# Patient Record
Sex: FEMALE | Race: WHITE | NOT HISPANIC OR LATINO | Employment: OTHER
[De-identification: names, ages, dates, MRNs, and addresses within clinical notes are randomized per-mention and may not be internally consistent; named-entity substitution may affect disease eponyms.]

---

## 2017-06-21 ENCOUNTER — LAB SERVICES (OUTPATIENT)
Dept: OTHER | Age: 72
End: 2017-06-21

## 2017-06-21 ENCOUNTER — CHARTING TRANS (OUTPATIENT)
Dept: OTHER | Age: 72
End: 2017-06-21

## 2017-06-22 LAB
ALBUMIN SERPL-MCNC: 4.2 G/DL (ref 3.6–5.1)
ALBUMIN/GLOB SERPL: 1.2 (ref 1–2.4)
ALP SERPL-CCNC: 77 UNITS/L (ref 45–117)
ALT SERPL-CCNC: 36 UNITS/L
ANION GAP SERPL CALC-SCNC: 11 MMOL/L (ref 10–20)
AST SERPL-CCNC: 23 UNITS/L
BILIRUB SERPL-MCNC: 0.6 MG/DL (ref 0.2–1)
BUN SERPL-MCNC: 18 MG/DL (ref 6–20)
BUN/CREAT SERPL: 27 (ref 7–25)
CALCIUM SERPL-MCNC: 10 MG/DL (ref 8.4–10.2)
CHLORIDE SERPL-SCNC: 104 MMOL/L (ref 98–107)
CHOLEST SERPL-MCNC: 223 MG/DL
CHOLEST/HDLC SERPL: 3.8
CO2 SERPL-SCNC: 30 MMOL/L (ref 21–32)
CREAT SERPL-MCNC: 0.68 MG/DL (ref 0.51–0.95)
GLOBULIN SER-MCNC: 3.4 G/DL (ref 2–4)
GLUCOSE SERPL-MCNC: 95 MG/DL (ref 65–99)
HBA1C MFR BLD: 5.6 % (ref 4.5–5.6)
HDLC SERPL-MCNC: 58 MG/DL
LDLC SERPL CALC-MCNC: 139 MG/DL
LENGTH OF FAST TIME PATIENT: ABNORMAL HRS
LENGTH OF FAST TIME PATIENT: ABNORMAL HRS
NONHDLC SERPL-MCNC: 165 MG/DL
POTASSIUM SERPL-SCNC: 4.3 MMOL/L (ref 3.4–5.1)
SODIUM SERPL-SCNC: 141 MMOL/L (ref 135–145)
TOTAL PROTEIN: 7.6 G/DL (ref 6.4–8.2)
TRIGL SERPL-MCNC: 131 MG/DL

## 2017-12-18 ENCOUNTER — LAB SERVICES (OUTPATIENT)
Dept: OTHER | Age: 72
End: 2017-12-18

## 2017-12-18 ENCOUNTER — CHARTING TRANS (OUTPATIENT)
Dept: OTHER | Age: 72
End: 2017-12-18

## 2017-12-19 LAB
ALBUMIN SERPL-MCNC: 4 G/DL (ref 3.6–5.1)
ALBUMIN/GLOB SERPL: 1.2 (ref 1–2.4)
ALP SERPL-CCNC: 70 UNITS/L (ref 45–117)
ALT SERPL-CCNC: 27 UNITS/L
ANION GAP SERPL CALC-SCNC: 14 MMOL/L (ref 10–20)
AST SERPL-CCNC: 17 UNITS/L
BILIRUB SERPL-MCNC: 0.6 MG/DL (ref 0.2–1)
BUN SERPL-MCNC: 12 MG/DL (ref 6–20)
BUN/CREAT SERPL: 19 (ref 7–25)
CALCIUM SERPL-MCNC: 9.9 MG/DL (ref 8.4–10.2)
CHLORIDE SERPL-SCNC: 103 MMOL/L (ref 98–107)
CHOLEST SERPL-MCNC: 215 MG/DL
CHOLEST/HDLC SERPL: 3.3
CO2 SERPL-SCNC: 29 MMOL/L (ref 21–32)
CREAT SERPL-MCNC: 0.64 MG/DL (ref 0.51–0.95)
GLOBULIN SER-MCNC: 3.2 G/DL (ref 2–4)
GLUCOSE SERPL-MCNC: 96 MG/DL (ref 65–99)
HDLC SERPL-MCNC: 65 MG/DL
LDLC SERPL CALC-MCNC: 123 MG/DL
LENGTH OF FAST TIME PATIENT: ABNORMAL HRS
LENGTH OF FAST TIME PATIENT: NORMAL HRS
NONHDLC SERPL-MCNC: 150 MG/DL
POTASSIUM SERPL-SCNC: 4 MMOL/L (ref 3.4–5.1)
SERVICE CMNT-IMP: NORMAL
SODIUM SERPL-SCNC: 142 MMOL/L (ref 135–145)
TOTAL PROTEIN: 7.2 G/DL (ref 6.4–8.2)
TRIGL SERPL-MCNC: 134 MG/DL

## 2017-12-21 LAB — TSH SERPL-ACNC: 2.67 MCUNITS/ML (ref 0.35–5)

## 2018-04-30 ENCOUNTER — CHARTING TRANS (OUTPATIENT)
Dept: OTHER | Age: 73
End: 2018-04-30

## 2018-06-13 ENCOUNTER — CHARTING TRANS (OUTPATIENT)
Dept: OTHER | Age: 73
End: 2018-06-13

## 2018-11-01 VITALS — RESPIRATION RATE: 16 BRPM | WEIGHT: 145 LBS | OXYGEN SATURATION: 97 % | HEART RATE: 66 BPM

## 2018-11-01 VITALS
TEMPERATURE: 98 F | OXYGEN SATURATION: 98 % | WEIGHT: 140 LBS | HEART RATE: 60 BPM | BODY MASS INDEX: 27.48 KG/M2 | HEIGHT: 60 IN | RESPIRATION RATE: 16 BRPM

## 2018-11-02 VITALS — WEIGHT: 139 LBS | HEART RATE: 60 BPM | RESPIRATION RATE: 16 BRPM

## 2018-11-03 VITALS
OXYGEN SATURATION: 98 % | HEART RATE: 56 BPM | TEMPERATURE: 97 F | RESPIRATION RATE: 16 BRPM | BODY MASS INDEX: 27.68 KG/M2 | HEIGHT: 60 IN | WEIGHT: 141 LBS

## 2018-12-18 ENCOUNTER — TELEPHONE (OUTPATIENT)
Dept: SCHEDULING | Age: 73
End: 2018-12-18

## 2018-12-20 RX ORDER — FLUTICASONE PROPIONATE 50 MCG
SPRAY, SUSPENSION (ML) NASAL
COMMUNITY
Start: 2018-04-30 | End: 2020-08-28 | Stop reason: SDUPTHER

## 2018-12-20 RX ORDER — FLUTICASONE PROPIONATE 50 MCG
SPRAY, SUSPENSION (ML) NASAL
COMMUNITY
End: 2018-12-24 | Stop reason: ALTCHOICE

## 2018-12-20 RX ORDER — LISINOPRIL AND HYDROCHLOROTHIAZIDE 12.5; 1 MG/1; MG/1
TABLET ORAL
COMMUNITY
Start: 2018-06-13 | End: 2018-12-24 | Stop reason: SDUPTHER

## 2018-12-20 RX ORDER — LORATADINE 10 MG/1
TABLET ORAL
COMMUNITY
End: 2019-03-27 | Stop reason: SDUPTHER

## 2018-12-24 ENCOUNTER — OFFICE VISIT (OUTPATIENT)
Dept: FAMILY MEDICINE | Age: 73
End: 2018-12-24

## 2018-12-24 VITALS
BODY MASS INDEX: 26.06 KG/M2 | SYSTOLIC BLOOD PRESSURE: 114 MMHG | WEIGHT: 138 LBS | HEART RATE: 62 BPM | TEMPERATURE: 97.1 F | DIASTOLIC BLOOD PRESSURE: 77 MMHG | HEIGHT: 61 IN | OXYGEN SATURATION: 98 %

## 2018-12-24 DIAGNOSIS — Z23 NEED FOR INFLUENZA VACCINATION: ICD-10-CM

## 2018-12-24 DIAGNOSIS — Z91.09 ENVIRONMENTAL ALLERGIES: ICD-10-CM

## 2018-12-24 DIAGNOSIS — R53.83 OTHER FATIGUE: ICD-10-CM

## 2018-12-24 DIAGNOSIS — Z12.39 SCREENING FOR BREAST CANCER: ICD-10-CM

## 2018-12-24 DIAGNOSIS — H35.30 MACULAR DEGENERATION OF BOTH EYES, UNSPECIFIED TYPE: ICD-10-CM

## 2018-12-24 DIAGNOSIS — Z13.820 SCREENING FOR OSTEOPOROSIS: ICD-10-CM

## 2018-12-24 DIAGNOSIS — I10 ESSENTIAL HYPERTENSION: Primary | ICD-10-CM

## 2018-12-24 DIAGNOSIS — M89.9 DISORDER OF BONE: ICD-10-CM

## 2018-12-24 DIAGNOSIS — E78.5 HYPERLIPIDEMIA, UNSPECIFIED HYPERLIPIDEMIA TYPE: ICD-10-CM

## 2018-12-24 PROBLEM — T78.3XXA ANGIOEDEMA: Status: ACTIVE | Noted: 2018-12-24

## 2018-12-24 PROBLEM — R59.0 LYMPHADENOPATHY OF LEFT CERVICAL REGION: Status: ACTIVE | Noted: 2018-12-24

## 2018-12-24 PROCEDURE — 90662 IIV NO PRSV INCREASED AG IM: CPT

## 2018-12-24 PROCEDURE — G0008 ADMIN INFLUENZA VIRUS VAC: HCPCS

## 2018-12-24 PROCEDURE — 99214 OFFICE O/P EST MOD 30 MIN: CPT | Performed by: FAMILY MEDICINE

## 2018-12-24 RX ORDER — LISINOPRIL AND HYDROCHLOROTHIAZIDE 12.5; 1 MG/1; MG/1
1 TABLET ORAL DAILY
Qty: 90 TABLET | Refills: 3 | Status: SHIPPED | OUTPATIENT
Start: 2018-12-24 | End: 2019-03-27 | Stop reason: SDUPTHER

## 2018-12-24 ASSESSMENT — ENCOUNTER SYMPTOMS
SINUS PAIN: 0
DIARRHEA: 0
NAUSEA: 0
WOUND: 0
EYE ITCHING: 0
FEVER: 0
SINUS PRESSURE: 0
HEADACHES: 0
CONSTIPATION: 0
FATIGUE: 1
ABDOMINAL PAIN: 0
EYE DISCHARGE: 0
EYE REDNESS: 0
WEAKNESS: 0
SHORTNESS OF BREATH: 0
RHINORRHEA: 0
EYE PAIN: 0
APPETITE CHANGE: 0
LIGHT-HEADEDNESS: 0
VOMITING: 0
WHEEZING: 0
ADENOPATHY: 1
DIZZINESS: 0
COUGH: 0
SORE THROAT: 0
TROUBLE SWALLOWING: 0
CHILLS: 0
NUMBNESS: 0

## 2018-12-24 ASSESSMENT — PATIENT HEALTH QUESTIONNAIRE - PHQ9
1. LITTLE INTEREST OR PLEASURE IN DOING THINGS: NOT AT ALL
SUM OF ALL RESPONSES TO PHQ9 QUESTIONS 1 AND 2: 0
2. FEELING DOWN, DEPRESSED OR HOPELESS: NOT AT ALL

## 2018-12-24 ASSESSMENT — COGNITIVE AND FUNCTIONAL STATUS - GENERAL
BECAUSE OF A PHYSICAL, MENTAL, OR EMOTIONAL CONDITION, DO YOU HAVE DIFFICULTY DOING ERRANDS ALONE: NO
DO YOU HAVE SERIOUS DIFFICULTY WALKING OR CLIMBING STAIRS: NO
DO YOU HAVE DIFFICULTY DRESSING OR BATHING: NO
BECAUSE OF A PHYSICAL, MENTAL, OR EMOTIONAL CONDITION, DO YOU HAVE SERIOUS DIFFICULTY CONCENTRATING, REMEMBERING OR MAKING DECISIONS: NO

## 2018-12-25 LAB
ALBUMIN SERPL-MCNC: 3.8 G/DL (ref 3.6–5.1)
ALBUMIN/GLOB SERPL: 1.2 {RATIO} (ref 1–2.4)
ALP SERPL-CCNC: 72 UNITS/L (ref 45–117)
ALT SERPL-CCNC: 25 UNITS/L
ANION GAP SERPL CALC-SCNC: 11 MMOL/L (ref 10–20)
AST SERPL-CCNC: 22 UNITS/L
BASOPHILS # BLD AUTO: 0 K/MCL (ref 0–0.3)
BASOPHILS NFR BLD AUTO: 1 %
BILIRUB SERPL-MCNC: 0.7 MG/DL (ref 0.2–1)
BUN SERPL-MCNC: 13 MG/DL (ref 6–20)
BUN/CREAT SERPL: 20 (ref 7–25)
CALCIUM SERPL-MCNC: 8.7 MG/DL (ref 8.4–10.2)
CHLORIDE SERPL-SCNC: 102 MMOL/L (ref 98–107)
CHOLEST SERPL-MCNC: 215 MG/DL
CHOLEST/HDLC SERPL: 3.7 {RATIO}
CO2 SERPL-SCNC: 29 MMOL/L (ref 21–32)
CREAT SERPL-MCNC: 0.66 MG/DL (ref 0.51–0.95)
DIFFERENTIAL METHOD BLD: NORMAL
EOSINOPHIL # BLD AUTO: 0.1 K/MCL (ref 0.1–0.5)
EOSINOPHIL NFR SPEC: 1 %
ERYTHROCYTE [DISTWIDTH] IN BLOOD: 12.3 % (ref 11–15)
FASTING STATUS PATIENT QL REPORTED: NORMAL HRS
GLOBULIN SER-MCNC: 3.3 G/DL (ref 2–4)
GLUCOSE SERPL-MCNC: 93 MG/DL (ref 65–99)
HCT VFR BLD CALC: 41 % (ref 36–46.5)
HDLC SERPL-MCNC: 58 MG/DL
HGB BLD-MCNC: 13.6 G/DL (ref 12–15.5)
IMM GRANULOCYTES # BLD AUTO: 0 K/MCL (ref 0–0.2)
IMM GRANULOCYTES NFR BLD: 1 %
LDLC SERPL-MCNC: 130 MG/DL
LENGTH OF FAST TIME PATIENT: ABNORMAL HRS
LYMPHOCYTES # BLD MANUAL: 2 K/MCL (ref 1–4)
LYMPHOCYTES NFR BLD MANUAL: 36 %
MCH RBC QN AUTO: 32.6 PG (ref 26–34)
MCHC RBC AUTO-ENTMCNC: 33.2 G/DL (ref 32–36.5)
MCV RBC AUTO: 98.3 FL (ref 78–100)
MONOCYTES # BLD MANUAL: 0.6 K/MCL (ref 0.3–0.9)
MONOCYTES NFR BLD MANUAL: 11 %
NEUTROPHILS # BLD: 2.8 K/MCL (ref 1.8–7.7)
NEUTROPHILS NFR BLD AUTO: 50 %
NONHDLC SERPL-MCNC: 157 MG/DL
NRBC BLD MANUAL-RTO: 0 /100 WBC
PLATELET # BLD: 312 K/MCL (ref 140–450)
POTASSIUM SERPL-SCNC: 3.6 MMOL/L (ref 3.4–5.1)
PROT SERPL-MCNC: 7.1 G/DL (ref 6.4–8.2)
RBC # BLD: 4.17 MIL/MCL (ref 4–5.2)
SODIUM SERPL-SCNC: 138 MMOL/L (ref 135–145)
TRIGL SERPL-MCNC: 135 MG/DL
TSH SERPL-ACNC: 2.3 MCUNITS/ML (ref 0.35–5)
WBC # BLD: 5.5 K/MCL (ref 4.2–11)

## 2019-01-01 ENCOUNTER — EXTERNAL RECORD (OUTPATIENT)
Dept: HEALTH INFORMATION MANAGEMENT | Facility: OTHER | Age: 74
End: 2019-01-01

## 2019-03-27 ENCOUNTER — TELEPHONE (OUTPATIENT)
Dept: SCHEDULING | Age: 74
End: 2019-03-27

## 2019-03-27 DIAGNOSIS — I10 ESSENTIAL HYPERTENSION: ICD-10-CM

## 2019-03-27 RX ORDER — LISINOPRIL AND HYDROCHLOROTHIAZIDE 12.5; 1 MG/1; MG/1
1 TABLET ORAL DAILY
Qty: 90 TABLET | Refills: 2 | Status: SHIPPED | OUTPATIENT
Start: 2019-03-27 | End: 2019-11-04 | Stop reason: SDUPTHER

## 2019-03-27 RX ORDER — LORATADINE 10 MG/1
10 TABLET ORAL DAILY
Qty: 90 TABLET | Refills: 0 | Status: SHIPPED | OUTPATIENT
Start: 2019-03-27 | End: 2019-11-04 | Stop reason: SDUPTHER

## 2019-08-12 ENCOUNTER — TELEPHONE (OUTPATIENT)
Dept: SCHEDULING | Age: 74
End: 2019-08-12

## 2019-08-12 RX ORDER — FLUTICASONE PROPIONATE 50 MCG
2 SPRAY, SUSPENSION (ML) NASAL DAILY
Qty: 16 G | Refills: 1 | Status: SHIPPED | OUTPATIENT
Start: 2019-08-12 | End: 2019-11-04 | Stop reason: SDUPTHER

## 2019-08-26 ENCOUNTER — TELEPHONE (OUTPATIENT)
Dept: SCHEDULING | Age: 74
End: 2019-08-26

## 2019-10-23 ENCOUNTER — TELEPHONE (OUTPATIENT)
Dept: SCHEDULING | Age: 74
End: 2019-10-23

## 2019-10-23 ENCOUNTER — OFFICE VISIT (OUTPATIENT)
Dept: FAMILY MEDICINE | Age: 74
End: 2019-10-23

## 2019-10-23 VITALS
TEMPERATURE: 98.4 F | HEART RATE: 78 BPM | WEIGHT: 139.66 LBS | DIASTOLIC BLOOD PRESSURE: 70 MMHG | SYSTOLIC BLOOD PRESSURE: 110 MMHG | BODY MASS INDEX: 26.37 KG/M2 | HEIGHT: 61 IN

## 2019-10-23 DIAGNOSIS — J06.9 VIRAL URI WITH COUGH: Primary | ICD-10-CM

## 2019-10-23 DIAGNOSIS — Z23 NEEDS FLU SHOT: ICD-10-CM

## 2019-10-23 PROCEDURE — 90662 IIV NO PRSV INCREASED AG IM: CPT

## 2019-10-23 PROCEDURE — 99213 OFFICE O/P EST LOW 20 MIN: CPT | Performed by: FAMILY MEDICINE

## 2019-10-23 PROCEDURE — G0008 ADMIN INFLUENZA VIRUS VAC: HCPCS

## 2019-10-23 SDOH — HEALTH STABILITY: PHYSICAL HEALTH: ON AVERAGE, HOW MANY DAYS PER WEEK DO YOU ENGAGE IN MODERATE TO STRENUOUS EXERCISE (LIKE A BRISK WALK)?: 0 DAYS

## 2019-10-23 SDOH — HEALTH STABILITY: PHYSICAL HEALTH: ON AVERAGE, HOW MANY MINUTES DO YOU ENGAGE IN EXERCISE AT THIS LEVEL?: 0 MIN

## 2019-10-23 ASSESSMENT — PATIENT HEALTH QUESTIONNAIRE - PHQ9
1. LITTLE INTEREST OR PLEASURE IN DOING THINGS: NOT AT ALL
SUM OF ALL RESPONSES TO PHQ9 QUESTIONS 1 AND 2: 0
SUM OF ALL RESPONSES TO PHQ9 QUESTIONS 1 AND 2: 0
2. FEELING DOWN, DEPRESSED OR HOPELESS: NOT AT ALL

## 2019-10-23 ASSESSMENT — ENCOUNTER SYMPTOMS
GASTROINTESTINAL NEGATIVE: 1
FEVER: 0

## 2019-11-04 ENCOUNTER — OFFICE VISIT (OUTPATIENT)
Dept: FAMILY MEDICINE | Age: 74
End: 2019-11-04

## 2019-11-04 VITALS
HEIGHT: 61 IN | HEART RATE: 67 BPM | WEIGHT: 137 LBS | DIASTOLIC BLOOD PRESSURE: 64 MMHG | BODY MASS INDEX: 25.86 KG/M2 | SYSTOLIC BLOOD PRESSURE: 102 MMHG | RESPIRATION RATE: 18 BRPM

## 2019-11-04 DIAGNOSIS — I10 ESSENTIAL HYPERTENSION: Primary | ICD-10-CM

## 2019-11-04 DIAGNOSIS — E78.5 HYPERLIPIDEMIA, UNSPECIFIED HYPERLIPIDEMIA TYPE: ICD-10-CM

## 2019-11-04 DIAGNOSIS — L98.9 FACE LESION: ICD-10-CM

## 2019-11-04 DIAGNOSIS — H35.3212 EXUDATIVE AGE-RELATED MACULAR DEGENERATION, RIGHT EYE, WITH INACTIVE CHOROIDAL NEOVASCULARIZATION (CMD): ICD-10-CM

## 2019-11-04 DIAGNOSIS — R05.9 COUGH: ICD-10-CM

## 2019-11-04 DIAGNOSIS — J30.1 SEASONAL ALLERGIC RHINITIS DUE TO POLLEN: ICD-10-CM

## 2019-11-04 DIAGNOSIS — Z12.31 ENCOUNTER FOR SCREENING MAMMOGRAM FOR MALIGNANT NEOPLASM OF BREAST: ICD-10-CM

## 2019-11-04 DIAGNOSIS — Z12.39 SCREENING FOR BREAST CANCER: ICD-10-CM

## 2019-11-04 DIAGNOSIS — H35.30 MACULAR DEGENERATION OF BOTH EYES, UNSPECIFIED TYPE: ICD-10-CM

## 2019-11-04 PROBLEM — T78.3XXA ANGIOEDEMA: Status: RESOLVED | Noted: 2018-12-24 | Resolved: 2019-11-04

## 2019-11-04 LAB
PEF AIRWAY PFM: 260 L/MIN
PEF AIRWAY PFM: 290 L/MIN
PEF AIRWAY PFM: 300 L/MIN
PEF P THERAPY AIRWAY PFM: NORMAL L/MIN

## 2019-11-04 PROCEDURE — 99214 OFFICE O/P EST MOD 30 MIN: CPT | Performed by: FAMILY MEDICINE

## 2019-11-04 PROCEDURE — 94150 VITAL CAPACITY TEST: CPT | Performed by: FAMILY MEDICINE

## 2019-11-04 RX ORDER — LORATADINE 10 MG/1
10 TABLET ORAL DAILY
Qty: 90 TABLET | Refills: 1 | Status: SHIPPED | OUTPATIENT
Start: 2019-11-04 | End: 2020-06-25 | Stop reason: SDUPTHER

## 2019-11-04 RX ORDER — PREDNISONE 20 MG/1
40 TABLET ORAL DAILY
Qty: 10 TABLET | Refills: 0 | Status: SHIPPED | OUTPATIENT
Start: 2019-11-04 | End: 2021-05-06 | Stop reason: SDUPTHER

## 2019-11-04 RX ORDER — LISINOPRIL AND HYDROCHLOROTHIAZIDE 12.5; 1 MG/1; MG/1
1 TABLET ORAL DAILY
Qty: 90 TABLET | Refills: 3 | Status: SHIPPED | OUTPATIENT
Start: 2019-11-04 | End: 2020-06-25 | Stop reason: SDUPTHER

## 2019-11-04 RX ORDER — FLUTICASONE PROPIONATE 50 MCG
2 SPRAY, SUSPENSION (ML) NASAL DAILY
Qty: 16 G | Refills: 1 | Status: SHIPPED | OUTPATIENT
Start: 2019-11-04 | End: 2020-06-25 | Stop reason: SDUPTHER

## 2019-11-04 ASSESSMENT — ENCOUNTER SYMPTOMS
SINUS PRESSURE: 0
SORE THROAT: 0
WEAKNESS: 0
TROUBLE SWALLOWING: 0
APPETITE CHANGE: 0
WHEEZING: 0
DIARRHEA: 0
LIGHT-HEADEDNESS: 0
RHINORRHEA: 0
EYE REDNESS: 0
SHORTNESS OF BREATH: 0
WOUND: 0
EYE DISCHARGE: 0
FEVER: 0
HEADACHES: 0
CHILLS: 0
EYE ITCHING: 0
VOMITING: 0
CONSTIPATION: 0
NUMBNESS: 0
SLEEP DISTURBANCE: 0
DIZZINESS: 0
ABDOMINAL PAIN: 0
FATIGUE: 1
ADENOPATHY: 1
EYE PAIN: 0
COUGH: 0
SINUS PAIN: 0
NAUSEA: 0

## 2019-11-04 ASSESSMENT — COGNITIVE AND FUNCTIONAL STATUS - GENERAL
BECAUSE OF A PHYSICAL, MENTAL, OR EMOTIONAL CONDITION, DO YOU HAVE SERIOUS DIFFICULTY CONCENTRATING, REMEMBERING OR MAKING DECISIONS: NO
DO YOU HAVE SERIOUS DIFFICULTY WALKING OR CLIMBING STAIRS: NO
DO YOU HAVE DIFFICULTY DRESSING OR BATHING: NO
BECAUSE OF A PHYSICAL, MENTAL, OR EMOTIONAL CONDITION, DO YOU HAVE DIFFICULTY DOING ERRANDS ALONE: NO

## 2019-11-04 ASSESSMENT — PATIENT HEALTH QUESTIONNAIRE - PHQ9
1. LITTLE INTEREST OR PLEASURE IN DOING THINGS: NOT AT ALL
SUM OF ALL RESPONSES TO PHQ9 QUESTIONS 1 AND 2: 0
2. FEELING DOWN, DEPRESSED OR HOPELESS: NOT AT ALL
SUM OF ALL RESPONSES TO PHQ9 QUESTIONS 1 AND 2: 0

## 2019-11-05 LAB
ALBUMIN SERPL-MCNC: 3.9 G/DL (ref 3.6–5.1)
ALBUMIN/GLOB SERPL: 1.1 {RATIO} (ref 1–2.4)
ALP SERPL-CCNC: 80 UNITS/L (ref 45–117)
ALT SERPL-CCNC: 25 UNITS/L
ANION GAP SERPL CALC-SCNC: 13 MMOL/L (ref 10–20)
AST SERPL-CCNC: 15 UNITS/L
BILIRUB SERPL-MCNC: 0.6 MG/DL (ref 0.2–1)
BUN SERPL-MCNC: 13 MG/DL (ref 6–20)
BUN/CREAT SERPL: 21 (ref 7–25)
CALCIUM SERPL-MCNC: 9.1 MG/DL (ref 8.4–10.2)
CHLORIDE SERPL-SCNC: 104 MMOL/L (ref 98–107)
CHOLEST SERPL-MCNC: 226 MG/DL
CHOLEST/HDLC SERPL: 4.3 {RATIO}
CO2 SERPL-SCNC: 26 MMOL/L (ref 21–32)
CREAT SERPL-MCNC: 0.63 MG/DL (ref 0.51–0.95)
FASTING STATUS PATIENT QL REPORTED: NORMAL HRS
GLOBULIN SER-MCNC: 3.6 G/DL (ref 2–4)
GLUCOSE SERPL-MCNC: 86 MG/DL (ref 65–99)
HDLC SERPL-MCNC: 53 MG/DL
LDLC SERPL-MCNC: 144 MG/DL
LENGTH OF FAST TIME PATIENT: ABNORMAL HRS
NONHDLC SERPL-MCNC: 173 MG/DL
POTASSIUM SERPL-SCNC: 4 MMOL/L (ref 3.4–5.1)
PROT SERPL-MCNC: 7.5 G/DL (ref 6.4–8.2)
SODIUM SERPL-SCNC: 139 MMOL/L (ref 135–145)
TRIGL SERPL-MCNC: 147 MG/DL

## 2020-06-25 DIAGNOSIS — I10 ESSENTIAL HYPERTENSION: ICD-10-CM

## 2020-06-25 DIAGNOSIS — J30.1 SEASONAL ALLERGIC RHINITIS DUE TO POLLEN: ICD-10-CM

## 2020-06-25 RX ORDER — LISINOPRIL AND HYDROCHLOROTHIAZIDE 12.5; 1 MG/1; MG/1
1 TABLET ORAL DAILY
Qty: 90 TABLET | Refills: 0 | Status: SHIPPED | OUTPATIENT
Start: 2020-06-25 | End: 2020-07-01 | Stop reason: SDUPTHER

## 2020-06-25 RX ORDER — LORATADINE 10 MG/1
10 TABLET ORAL DAILY
Qty: 90 TABLET | Refills: 0 | Status: SHIPPED | OUTPATIENT
Start: 2020-06-25 | End: 2020-07-01 | Stop reason: SDUPTHER

## 2020-06-25 RX ORDER — FLUTICASONE PROPIONATE 50 MCG
2 SPRAY, SUSPENSION (ML) NASAL DAILY
Qty: 16 G | Refills: 0 | Status: SHIPPED | OUTPATIENT
Start: 2020-06-25 | End: 2020-07-01 | Stop reason: SDUPTHER

## 2020-07-01 DIAGNOSIS — J30.1 SEASONAL ALLERGIC RHINITIS DUE TO POLLEN: ICD-10-CM

## 2020-07-01 DIAGNOSIS — I10 ESSENTIAL HYPERTENSION: ICD-10-CM

## 2020-07-01 RX ORDER — LORATADINE 10 MG/1
10 TABLET ORAL DAILY
Qty: 90 TABLET | Refills: 0 | OUTPATIENT
Start: 2020-07-01

## 2020-07-01 RX ORDER — FLUTICASONE PROPIONATE 50 MCG
2 SPRAY, SUSPENSION (ML) NASAL DAILY
Qty: 16 G | Refills: 0 | OUTPATIENT
Start: 2020-07-01 | End: 2020-08-28 | Stop reason: ALTCHOICE

## 2020-07-01 RX ORDER — LISINOPRIL AND HYDROCHLOROTHIAZIDE 12.5; 1 MG/1; MG/1
1 TABLET ORAL DAILY
Qty: 90 TABLET | Refills: 0 | OUTPATIENT
Start: 2020-07-01 | End: 2020-08-28 | Stop reason: SDUPTHER

## 2020-08-21 ENCOUNTER — TELEPHONE (OUTPATIENT)
Dept: SCHEDULING | Age: 75
End: 2020-08-21

## 2020-08-28 ENCOUNTER — OFFICE VISIT (OUTPATIENT)
Dept: FAMILY MEDICINE | Age: 75
End: 2020-08-28

## 2020-08-28 VITALS
OXYGEN SATURATION: 97 % | HEART RATE: 65 BPM | DIASTOLIC BLOOD PRESSURE: 70 MMHG | BODY MASS INDEX: 25.68 KG/M2 | TEMPERATURE: 97.6 F | HEIGHT: 61 IN | WEIGHT: 136 LBS | RESPIRATION RATE: 16 BRPM | SYSTOLIC BLOOD PRESSURE: 124 MMHG

## 2020-08-28 DIAGNOSIS — E78.5 HYPERLIPIDEMIA, UNSPECIFIED HYPERLIPIDEMIA TYPE: ICD-10-CM

## 2020-08-28 DIAGNOSIS — Z23 NEED FOR INFLUENZA VACCINATION: ICD-10-CM

## 2020-08-28 DIAGNOSIS — I10 ESSENTIAL HYPERTENSION: ICD-10-CM

## 2020-08-28 DIAGNOSIS — R20.8 DYSESTHESIA: ICD-10-CM

## 2020-08-28 DIAGNOSIS — R53.83 OTHER FATIGUE: Primary | ICD-10-CM

## 2020-08-28 PROCEDURE — G0008 ADMIN INFLUENZA VIRUS VAC: HCPCS

## 2020-08-28 PROCEDURE — 90662 IIV NO PRSV INCREASED AG IM: CPT

## 2020-08-28 PROCEDURE — 99213 OFFICE O/P EST LOW 20 MIN: CPT | Performed by: NURSE PRACTITIONER

## 2020-08-28 RX ORDER — LISINOPRIL AND HYDROCHLOROTHIAZIDE 12.5; 1 MG/1; MG/1
1 TABLET ORAL DAILY
Qty: 90 TABLET | Refills: 3 | Status: SHIPPED | OUTPATIENT
Start: 2020-08-28 | End: 2020-12-17 | Stop reason: SDUPTHER

## 2020-08-28 RX ORDER — FLUTICASONE PROPIONATE 50 MCG
2 SPRAY, SUSPENSION (ML) NASAL DAILY
Qty: 1 BOTTLE | Refills: 3 | Status: SHIPPED | OUTPATIENT
Start: 2020-08-28 | End: 2021-09-03 | Stop reason: SDUPTHER

## 2020-08-28 ASSESSMENT — ENCOUNTER SYMPTOMS
NEUROLOGICAL NEGATIVE: 1
FATIGUE: 0
ALLERGIC/IMMUNOLOGIC NEGATIVE: 1
CONSTITUTIONAL NEGATIVE: 1
APPETITE CHANGE: 0
ENDOCRINE NEGATIVE: 1
RESPIRATORY NEGATIVE: 1
WEAKNESS: 0
EYES NEGATIVE: 1
PSYCHIATRIC NEGATIVE: 1
HEMATOLOGIC/LYMPHATIC NEGATIVE: 1
GASTROINTESTINAL NEGATIVE: 1
FEVER: 0
NUMBNESS: 0
POLYDIPSIA: 0
POLYPHAGIA: 0
SHORTNESS OF BREATH: 0

## 2020-08-28 ASSESSMENT — PATIENT HEALTH QUESTIONNAIRE - PHQ9
2. FEELING DOWN, DEPRESSED OR HOPELESS: NOT AT ALL
CLINICAL INTERPRETATION OF PHQ9 SCORE: NO FURTHER SCREENING NEEDED
1. LITTLE INTEREST OR PLEASURE IN DOING THINGS: NOT AT ALL
CLINICAL INTERPRETATION OF PHQ2 SCORE: NO FURTHER SCREENING NEEDED
SUM OF ALL RESPONSES TO PHQ9 QUESTIONS 1 AND 2: 0
SUM OF ALL RESPONSES TO PHQ9 QUESTIONS 1 AND 2: 0

## 2020-08-29 LAB
ALBUMIN SERPL-MCNC: 3.9 G/DL (ref 3.6–5.1)
ALBUMIN/GLOB SERPL: 1.3 {RATIO} (ref 1–2.4)
ALP SERPL-CCNC: 61 UNITS/L (ref 45–117)
ALT SERPL-CCNC: 23 UNITS/L
ANION GAP SERPL CALC-SCNC: 10 MMOL/L (ref 10–20)
AST SERPL-CCNC: 16 UNITS/L
BASOPHILS # BLD: 0 K/MCL (ref 0–0.3)
BASOPHILS NFR BLD: 1 %
BILIRUB SERPL-MCNC: 0.8 MG/DL (ref 0.2–1)
BUN SERPL-MCNC: 16 MG/DL (ref 6–20)
BUN/CREAT SERPL: 27 (ref 7–25)
CALCIUM SERPL-MCNC: 9.5 MG/DL (ref 8.4–10.2)
CHLORIDE SERPL-SCNC: 105 MMOL/L (ref 98–107)
CHOLEST SERPL-MCNC: 209 MG/DL
CHOLEST/HDLC SERPL: 3.4 {RATIO}
CO2 SERPL-SCNC: 30 MMOL/L (ref 21–32)
CREAT SERPL-MCNC: 0.59 MG/DL (ref 0.51–0.95)
DIFFERENTIAL METHOD BLD: ABNORMAL
EOSINOPHIL # BLD: 0.1 K/MCL (ref 0.1–0.5)
EOSINOPHIL NFR BLD: 1 %
ERYTHROCYTE [DISTWIDTH] IN BLOOD: 12.4 % (ref 11–15)
GLOBULIN SER-MCNC: 3 G/DL (ref 2–4)
GLUCOSE SERPL-MCNC: 95 MG/DL (ref 65–99)
HBA1C MFR BLD: 5.5 % (ref 4.5–5.6)
HCT VFR BLD CALC: 38.9 % (ref 36–46.5)
HDLC SERPL-MCNC: 62 MG/DL
HGB BLD-MCNC: 13.4 G/DL (ref 12–15.5)
IMM GRANULOCYTES # BLD AUTO: 0 K/MCL (ref 0–0.2)
IMM GRANULOCYTES NFR BLD: 0 %
LDLC SERPL CALC-MCNC: 127 MG/DL
LENGTH OF FAST TIME PATIENT: ABNORMAL HRS
LENGTH OF FAST TIME PATIENT: ABNORMAL HRS
LYMPHOCYTES # BLD: 1.9 K/MCL (ref 1–4)
LYMPHOCYTES NFR BLD: 43 %
MCH RBC QN AUTO: 33.8 PG (ref 26–34)
MCHC RBC AUTO-ENTMCNC: 34.4 G/DL (ref 32–36.5)
MCV RBC AUTO: 98.2 FL (ref 78–100)
MONOCYTES # BLD: 0.5 K/MCL (ref 0.3–0.9)
MONOCYTES NFR BLD: 10 %
NEUTROPHILS # BLD: 2 K/MCL (ref 1.8–7.7)
NEUTROPHILS NFR BLD: 45 %
NONHDLC SERPL-MCNC: 147 MG/DL
NRBC BLD MANUAL-RTO: 0 /100 WBC
PLATELET # BLD: 255 K/MCL (ref 140–450)
POTASSIUM SERPL-SCNC: 3.8 MMOL/L (ref 3.4–5.1)
PROT SERPL-MCNC: 6.9 G/DL (ref 6.4–8.2)
RBC # BLD: 3.96 MIL/MCL (ref 4–5.2)
SODIUM SERPL-SCNC: 141 MMOL/L (ref 135–145)
TRIGL SERPL-MCNC: 99 MG/DL
TSH SERPL-ACNC: 2.75 MCUNITS/ML (ref 0.35–5)
WBC # BLD: 4.5 K/MCL (ref 4.2–11)

## 2020-12-14 DIAGNOSIS — I10 ESSENTIAL HYPERTENSION: ICD-10-CM

## 2020-12-14 RX ORDER — LISINOPRIL AND HYDROCHLOROTHIAZIDE 12.5; 1 MG/1; MG/1
1 TABLET ORAL DAILY
Qty: 90 TABLET | Refills: 3 | OUTPATIENT
Start: 2020-12-14 | End: 2021-12-14

## 2020-12-17 ENCOUNTER — TELEPHONE (OUTPATIENT)
Dept: SCHEDULING | Age: 75
End: 2020-12-17

## 2020-12-17 DIAGNOSIS — I10 ESSENTIAL HYPERTENSION: ICD-10-CM

## 2020-12-17 RX ORDER — LISINOPRIL AND HYDROCHLOROTHIAZIDE 12.5; 1 MG/1; MG/1
1 TABLET ORAL DAILY
Qty: 90 TABLET | Refills: 2 | Status: SHIPPED | OUTPATIENT
Start: 2020-12-17 | End: 2021-09-07 | Stop reason: SDUPTHER

## 2021-01-01 ENCOUNTER — EXTERNAL RECORD (OUTPATIENT)
Dept: HEALTH INFORMATION MANAGEMENT | Facility: OTHER | Age: 76
End: 2021-01-01

## 2021-04-29 ENCOUNTER — WALK IN (OUTPATIENT)
Dept: URGENT CARE | Age: 76
End: 2021-04-29
Attending: FAMILY MEDICINE

## 2021-04-29 DIAGNOSIS — J01.10 ACUTE NON-RECURRENT FRONTAL SINUSITIS: ICD-10-CM

## 2021-04-29 DIAGNOSIS — R51.9 SINUS HEADACHE: ICD-10-CM

## 2021-04-29 DIAGNOSIS — Z20.822 SUSPECTED COVID-19 VIRUS INFECTION: Primary | ICD-10-CM

## 2021-04-29 PROCEDURE — U0005 INFEC AGEN DETEC AMPLI PROBE: HCPCS | Performed by: NURSE PRACTITIONER

## 2021-04-29 PROCEDURE — C9803 HOPD COVID-19 SPEC COLLECT: HCPCS

## 2021-04-29 PROCEDURE — 99202 OFFICE O/P NEW SF 15 MIN: CPT

## 2021-04-29 RX ORDER — AMOXICILLIN AND CLAVULANATE POTASSIUM 875; 125 MG/1; MG/1
1 TABLET, FILM COATED ORAL 2 TIMES DAILY
Qty: 14 TABLET | Refills: 0 | Status: SHIPPED | OUTPATIENT
Start: 2021-04-29 | End: 2021-05-06

## 2021-04-29 ASSESSMENT — ENCOUNTER SYMPTOMS
WEAKNESS: 0
HEADACHES: 1
SINUS PAIN: 1
ABDOMINAL PAIN: 0
DIARRHEA: 0
COUGH: 0
SORE THROAT: 0
VOMITING: 0
EYE PAIN: 0
FEVER: 0
CHILLS: 0
DIZZINESS: 0
NAUSEA: 0

## 2021-04-29 ASSESSMENT — PAIN SCALES - GENERAL: PAINLEVEL: 3-4

## 2021-04-30 ENCOUNTER — TELEPHONE (OUTPATIENT)
Dept: SCHEDULING | Age: 76
End: 2021-04-30

## 2021-04-30 LAB
SARS-COV-2 RNA RESP QL NAA+PROBE: NOT DETECTED
SERVICE CMNT-IMP: NORMAL
SERVICE CMNT-IMP: NORMAL

## 2021-05-05 ENCOUNTER — TELEPHONE (OUTPATIENT)
Dept: SCHEDULING | Age: 76
End: 2021-05-05

## 2021-05-06 ENCOUNTER — V-VISIT (OUTPATIENT)
Dept: FAMILY MEDICINE | Age: 76
End: 2021-05-06

## 2021-05-06 DIAGNOSIS — R21 RASH: ICD-10-CM

## 2021-05-06 DIAGNOSIS — R51.9 DAILY HEADACHE: Primary | ICD-10-CM

## 2021-05-06 PROCEDURE — 99213 OFFICE O/P EST LOW 20 MIN: CPT | Performed by: FAMILY MEDICINE

## 2021-05-06 RX ORDER — TRIAMCINOLONE ACETONIDE 1 MG/G
CREAM TOPICAL 2 TIMES DAILY
Qty: 15 G | Refills: 1 | Status: SHIPPED | OUTPATIENT
Start: 2021-05-06 | End: 2021-06-10 | Stop reason: ALTCHOICE

## 2021-05-06 RX ORDER — PREDNISONE 20 MG/1
40 TABLET ORAL DAILY
Qty: 10 TABLET | Refills: 1 | Status: SHIPPED | OUTPATIENT
Start: 2021-05-06 | End: 2021-05-11

## 2021-05-10 ENCOUNTER — APPOINTMENT (OUTPATIENT)
Dept: FAMILY MEDICINE | Age: 76
End: 2021-05-10

## 2021-05-25 VITALS
OXYGEN SATURATION: 97 % | BODY MASS INDEX: 23.92 KG/M2 | TEMPERATURE: 96.8 F | DIASTOLIC BLOOD PRESSURE: 79 MMHG | SYSTOLIC BLOOD PRESSURE: 129 MMHG | RESPIRATION RATE: 18 BRPM | HEIGHT: 63 IN | HEART RATE: 66 BPM | WEIGHT: 135 LBS

## 2021-06-10 ENCOUNTER — OFFICE VISIT (OUTPATIENT)
Dept: INTERNAL MEDICINE | Age: 76
End: 2021-06-10

## 2021-06-10 ENCOUNTER — TELEPHONE (OUTPATIENT)
Dept: SCHEDULING | Age: 76
End: 2021-06-10

## 2021-06-10 VITALS
HEIGHT: 63 IN | HEART RATE: 75 BPM | OXYGEN SATURATION: 97 % | BODY MASS INDEX: 24.04 KG/M2 | TEMPERATURE: 98.9 F | DIASTOLIC BLOOD PRESSURE: 70 MMHG | SYSTOLIC BLOOD PRESSURE: 126 MMHG | WEIGHT: 135.7 LBS | RESPIRATION RATE: 16 BRPM

## 2021-06-10 DIAGNOSIS — I10 ESSENTIAL HYPERTENSION: ICD-10-CM

## 2021-06-10 DIAGNOSIS — N39.0 ACUTE UTI: Primary | ICD-10-CM

## 2021-06-10 LAB
APPEARANCE, POC: ABNORMAL
BILIRUBIN, POC: NEGATIVE
COLOR, POC: YELLOW
GLUCOSE UR-MCNC: NEGATIVE MG/DL
KETONES, POC: ABNORMAL MG/DL
NITRITE, POC: NEGATIVE
OCCULT BLOOD, POC: ABNORMAL
PH UR: 6 [PH] (ref 5–7)
PROT UR-MCNC: ABNORMAL MG/DL
SP GR UR: 1 (ref 1–1.03)
UROBILINOGEN UR-MCNC: 0.2 MG/DL (ref 0–1)
WBC (LEUKOCYTE) ESTERASE, POC: ABNORMAL

## 2021-06-10 PROCEDURE — 99213 OFFICE O/P EST LOW 20 MIN: CPT | Performed by: NURSE PRACTITIONER

## 2021-06-10 PROCEDURE — 81002 URINALYSIS NONAUTO W/O SCOPE: CPT | Performed by: NURSE PRACTITIONER

## 2021-06-10 RX ORDER — NITROFURANTOIN 25; 75 MG/1; MG/1
100 CAPSULE ORAL 2 TIMES DAILY
Qty: 10 CAPSULE | Refills: 0 | Status: SHIPPED | OUTPATIENT
Start: 2021-06-10 | End: 2021-06-15

## 2021-06-10 ASSESSMENT — ENCOUNTER SYMPTOMS
GASTROINTESTINAL NEGATIVE: 1
NEUROLOGICAL NEGATIVE: 1
RESPIRATORY NEGATIVE: 1
PSYCHIATRIC NEGATIVE: 1
CONSTITUTIONAL NEGATIVE: 1
EYES NEGATIVE: 1

## 2021-06-10 ASSESSMENT — PATIENT HEALTH QUESTIONNAIRE - PHQ9
SUM OF ALL RESPONSES TO PHQ9 QUESTIONS 1 AND 2: 0
SUM OF ALL RESPONSES TO PHQ9 QUESTIONS 1 AND 2: 0
CLINICAL INTERPRETATION OF PHQ9 SCORE: NO FURTHER SCREENING NEEDED
2. FEELING DOWN, DEPRESSED OR HOPELESS: NOT AT ALL
CLINICAL INTERPRETATION OF PHQ2 SCORE: NO FURTHER SCREENING NEEDED
1. LITTLE INTEREST OR PLEASURE IN DOING THINGS: NOT AT ALL

## 2021-08-30 RX ORDER — FLUTICASONE PROPIONATE 50 MCG
2 SPRAY, SUSPENSION (ML) NASAL DAILY
OUTPATIENT
Start: 2021-08-30 | End: 2024-10-31

## 2021-09-03 ENCOUNTER — TELEPHONE (OUTPATIENT)
Dept: SCHEDULING | Age: 76
End: 2021-09-03

## 2021-09-03 RX ORDER — FLUTICASONE PROPIONATE 50 MCG
2 SPRAY, SUSPENSION (ML) NASAL DAILY
Qty: 9.9 ML | Refills: 0 | Status: SHIPPED | OUTPATIENT
Start: 2021-09-03 | End: 2021-09-07 | Stop reason: SDUPTHER

## 2021-09-07 ENCOUNTER — OFFICE VISIT (OUTPATIENT)
Dept: FAMILY MEDICINE | Age: 76
End: 2021-09-07

## 2021-09-07 VITALS
BODY MASS INDEX: 23.92 KG/M2 | DIASTOLIC BLOOD PRESSURE: 84 MMHG | HEIGHT: 63 IN | HEART RATE: 72 BPM | TEMPERATURE: 96.6 F | OXYGEN SATURATION: 95 % | WEIGHT: 135 LBS | SYSTOLIC BLOOD PRESSURE: 118 MMHG

## 2021-09-07 DIAGNOSIS — R73.01 IMPAIRED FASTING GLUCOSE: ICD-10-CM

## 2021-09-07 DIAGNOSIS — Z11.59 NEED FOR HEPATITIS C SCREENING TEST: ICD-10-CM

## 2021-09-07 DIAGNOSIS — E55.9 VITAMIN D DEFICIENCY: ICD-10-CM

## 2021-09-07 DIAGNOSIS — H35.3212 EXUDATIVE AGE-RELATED MACULAR DEGENERATION, RIGHT EYE, WITH INACTIVE CHOROIDAL NEOVASCULARIZATION (CMD): ICD-10-CM

## 2021-09-07 DIAGNOSIS — I10 ESSENTIAL HYPERTENSION: Primary | ICD-10-CM

## 2021-09-07 PROCEDURE — 80053 COMPREHEN METABOLIC PANEL: CPT | Performed by: CLINICAL MEDICAL LABORATORY

## 2021-09-07 PROCEDURE — 83036 HEMOGLOBIN GLYCOSYLATED A1C: CPT | Performed by: CLINICAL MEDICAL LABORATORY

## 2021-09-07 PROCEDURE — G0472 HEP C SCREEN HIGH RISK/OTHER: HCPCS | Performed by: CLINICAL MEDICAL LABORATORY

## 2021-09-07 PROCEDURE — 85025 COMPLETE CBC W/AUTO DIFF WBC: CPT | Performed by: CLINICAL MEDICAL LABORATORY

## 2021-09-07 PROCEDURE — 99214 OFFICE O/P EST MOD 30 MIN: CPT | Performed by: FAMILY MEDICINE

## 2021-09-07 PROCEDURE — 82306 VITAMIN D 25 HYDROXY: CPT | Performed by: CLINICAL MEDICAL LABORATORY

## 2021-09-07 PROCEDURE — 84443 ASSAY THYROID STIM HORMONE: CPT | Performed by: CLINICAL MEDICAL LABORATORY

## 2021-09-07 RX ORDER — LISINOPRIL AND HYDROCHLOROTHIAZIDE 12.5; 1 MG/1; MG/1
1 TABLET ORAL DAILY
Qty: 90 TABLET | Refills: 2 | Status: SHIPPED | OUTPATIENT
Start: 2021-09-07 | End: 2021-09-16 | Stop reason: SDUPTHER

## 2021-09-07 RX ORDER — FLUTICASONE PROPIONATE 50 MCG
2 SPRAY, SUSPENSION (ML) NASAL DAILY
Qty: 9.9 ML | Refills: 5 | Status: SHIPPED | OUTPATIENT
Start: 2021-09-07 | End: 2021-09-16 | Stop reason: SDUPTHER

## 2021-09-07 ASSESSMENT — ENCOUNTER SYMPTOMS
PSYCHIATRIC NEGATIVE: 1
RESPIRATORY NEGATIVE: 1
CONSTITUTIONAL NEGATIVE: 1
GASTROINTESTINAL NEGATIVE: 1

## 2021-09-08 LAB
25(OH)D3+25(OH)D2 SERPL-MCNC: 40.3 NG/ML (ref 30–100)
ALBUMIN SERPL-MCNC: 3.9 G/DL (ref 3.6–5.1)
ALBUMIN/GLOB SERPL: 1.3 {RATIO} (ref 1–2.4)
ALP SERPL-CCNC: 67 UNITS/L (ref 45–117)
ALT SERPL-CCNC: 28 UNITS/L
ANION GAP SERPL CALC-SCNC: 12 MMOL/L (ref 10–20)
AST SERPL-CCNC: 17 UNITS/L
BASOPHILS # BLD: 0.1 K/MCL (ref 0–0.3)
BASOPHILS NFR BLD: 1 %
BILIRUB SERPL-MCNC: 0.5 MG/DL (ref 0.2–1)
BUN SERPL-MCNC: 20 MG/DL (ref 6–20)
BUN/CREAT SERPL: 29 (ref 7–25)
CALCIUM SERPL-MCNC: 8.6 MG/DL (ref 8.4–10.2)
CHLORIDE SERPL-SCNC: 102 MMOL/L (ref 98–107)
CO2 SERPL-SCNC: 27 MMOL/L (ref 21–32)
CREAT SERPL-MCNC: 0.7 MG/DL (ref 0.51–0.95)
DEPRECATED RDW RBC: 46.3 FL (ref 39–50)
EOSINOPHIL # BLD: 0.1 K/MCL (ref 0–0.5)
EOSINOPHIL NFR BLD: 1 %
ERYTHROCYTE [DISTWIDTH] IN BLOOD: 12.3 % (ref 11–15)
FASTING DURATION TIME PATIENT: ABNORMAL H
GFR SERPLBLD BASED ON 1.73 SQ M-ARVRAT: 84 ML/MIN
GLOBULIN SER-MCNC: 2.9 G/DL (ref 2–4)
GLUCOSE SERPL-MCNC: 122 MG/DL (ref 65–99)
HBA1C MFR BLD: 5.7 % (ref 4.5–5.6)
HCT VFR BLD CALC: 38.7 % (ref 36–46.5)
HCV AB SER QL: NEGATIVE
HGB BLD-MCNC: 13 G/DL (ref 12–15.5)
IMM GRANULOCYTES # BLD AUTO: 0 K/MCL (ref 0–0.2)
IMM GRANULOCYTES # BLD: 0 %
LYMPHOCYTES # BLD: 2.4 K/MCL (ref 1–4)
LYMPHOCYTES NFR BLD: 46 %
MCH RBC QN AUTO: 33.9 PG (ref 26–34)
MCHC RBC AUTO-ENTMCNC: 33.6 G/DL (ref 32–36.5)
MCV RBC AUTO: 100.8 FL (ref 78–100)
MONOCYTES # BLD: 0.5 K/MCL (ref 0.3–0.9)
MONOCYTES NFR BLD: 10 %
NEUTROPHILS # BLD: 2.2 K/MCL (ref 1.8–7.7)
NEUTROPHILS NFR BLD: 42 %
NRBC BLD MANUAL-RTO: 0 /100 WBC
PLATELET # BLD AUTO: 256 K/MCL (ref 140–450)
POTASSIUM SERPL-SCNC: 3.6 MMOL/L (ref 3.4–5.1)
PROT SERPL-MCNC: 6.8 G/DL (ref 6.4–8.2)
RBC # BLD: 3.84 MIL/MCL (ref 4–5.2)
SODIUM SERPL-SCNC: 137 MMOL/L (ref 135–145)
TSH SERPL-ACNC: 3.08 MCUNITS/ML (ref 0.35–5)
WBC # BLD: 5.3 K/MCL (ref 4.2–11)

## 2021-09-16 DIAGNOSIS — I10 ESSENTIAL HYPERTENSION: ICD-10-CM

## 2021-09-16 RX ORDER — FLUTICASONE PROPIONATE 50 MCG
2 SPRAY, SUSPENSION (ML) NASAL DAILY
Qty: 9.9 ML | Refills: 5 | Status: SHIPPED | OUTPATIENT
Start: 2021-09-16 | End: 2023-05-08 | Stop reason: SDUPTHER

## 2021-09-16 RX ORDER — LISINOPRIL AND HYDROCHLOROTHIAZIDE 12.5; 1 MG/1; MG/1
1 TABLET ORAL DAILY
Qty: 90 TABLET | Refills: 2 | Status: SHIPPED | OUTPATIENT
Start: 2021-09-16 | End: 2022-04-18 | Stop reason: SDUPTHER

## 2021-10-25 ENCOUNTER — CLINICAL ABSTRACT (OUTPATIENT)
Dept: HEALTH INFORMATION MANAGEMENT | Age: 76
End: 2021-10-25

## 2021-12-13 ENCOUNTER — EXTERNAL RECORD (OUTPATIENT)
Dept: HEALTH INFORMATION MANAGEMENT | Facility: OTHER | Age: 76
End: 2021-12-13

## 2022-02-14 ENCOUNTER — EXTERNAL RECORD (OUTPATIENT)
Dept: HEALTH INFORMATION MANAGEMENT | Facility: OTHER | Age: 77
End: 2022-02-14

## 2022-03-10 ENCOUNTER — TELEPHONE (OUTPATIENT)
Dept: SCHEDULING | Age: 77
End: 2022-03-10

## 2022-04-14 ENCOUNTER — OFFICE VISIT (OUTPATIENT)
Dept: FAMILY MEDICINE | Age: 77
End: 2022-04-14

## 2022-04-14 VITALS
HEART RATE: 59 BPM | DIASTOLIC BLOOD PRESSURE: 77 MMHG | SYSTOLIC BLOOD PRESSURE: 122 MMHG | TEMPERATURE: 97 F | OXYGEN SATURATION: 95 % | WEIGHT: 136.6 LBS | RESPIRATION RATE: 16 BRPM | HEIGHT: 63 IN | BODY MASS INDEX: 24.2 KG/M2

## 2022-04-14 DIAGNOSIS — Z00.00 MEDICARE ANNUAL WELLNESS VISIT, INITIAL: Primary | ICD-10-CM

## 2022-04-14 PROCEDURE — G0438 PPPS, INITIAL VISIT: HCPCS | Performed by: FAMILY MEDICINE

## 2022-04-14 ASSESSMENT — VISUAL ACUITY
OD_CC: 20/25
OS_CC: 20/200

## 2022-04-14 ASSESSMENT — ACTIVITIES OF DAILY LIVING (ADL)
ADL_SHORT_OF_BREATH: NO
ADL_SCORE: 12
NEEDS_ASSIST: NO
ADL_BEFORE_ADMISSION: INDEPENDENT

## 2022-04-14 ASSESSMENT — MINI COG
TOTAL SCORE: 4
PATIENT WAS GIVEN REPEAT BACK WORDS FROM VERSION: 1 - BANANA SUNRISE CHAIR
PATIENT ABLE TO FILL IN THE CLOCK FACE WITH 10 MINUTES PAST 11 O'CLOCK?: YES, CLOCK IS CORRECT
PATIENT ABLE TO FILL IN THE CLOCK FACE WITH 10 MINUTES PAST 11 O'CLOCK?: YES, CLOCK IS CORRECT
PATIENT ABLE TO REPEAT THE 3 WORDS GIVEN PREVIOUSLY?: WAS ABLE TO REPEAT BACK 2 WORDS CORRECTLY
TOTAL SCORE: 4
PATIENT ABLE TO REPEAT THE 3 WORDS GIVEN PREVIOUSLY?: WAS ABLE TO REPEAT BACK 2 WORDS CORRECTLY
PATIENT WAS GIVEN REPEAT BACK WORDS FROM VERSION: 1 - BANANA SUNRISE CHAIR

## 2022-04-14 ASSESSMENT — PATIENT HEALTH QUESTIONNAIRE - PHQ9
CLINICAL INTERPRETATION OF PHQ2 SCORE: NO FURTHER SCREENING NEEDED
SUM OF ALL RESPONSES TO PHQ9 QUESTIONS 1 AND 2: 0
SUM OF ALL RESPONSES TO PHQ9 QUESTIONS 1 AND 2: 0
1. LITTLE INTEREST OR PLEASURE IN DOING THINGS: NOT AT ALL
2. FEELING DOWN, DEPRESSED OR HOPELESS: NOT AT ALL

## 2022-04-14 ASSESSMENT — PAIN SCALES - PAIN ENJOYMENT GENERAL ACTIVITY SCALE (PEG)
PEG_TOTALSCORE: 1
AVG_PAIN_PASTWEEK: 2
INTERFERED_GENERAL_ACTIVITY: 0 (DOES NOT INTERFERE)
INTERFERED_ENJOYMENT_LIFE: 0 (DOES NOT INTERFERE)

## 2022-04-18 ENCOUNTER — OFFICE VISIT (OUTPATIENT)
Dept: FAMILY MEDICINE | Age: 77
End: 2022-04-18

## 2022-04-18 VITALS
WEIGHT: 134 LBS | SYSTOLIC BLOOD PRESSURE: 111 MMHG | OXYGEN SATURATION: 96 % | DIASTOLIC BLOOD PRESSURE: 66 MMHG | HEIGHT: 63 IN | HEART RATE: 62 BPM | TEMPERATURE: 98.3 F | BODY MASS INDEX: 23.74 KG/M2

## 2022-04-18 DIAGNOSIS — Z12.11 SCREENING FOR COLON CANCER: ICD-10-CM

## 2022-04-18 DIAGNOSIS — I10 ESSENTIAL HYPERTENSION: Primary | ICD-10-CM

## 2022-04-18 DIAGNOSIS — R73.01 IMPAIRED FASTING GLUCOSE: ICD-10-CM

## 2022-04-18 DIAGNOSIS — J30.1 SEASONAL ALLERGIC RHINITIS DUE TO POLLEN: ICD-10-CM

## 2022-04-18 DIAGNOSIS — R41.3 MEMORY LOSS: ICD-10-CM

## 2022-04-18 DIAGNOSIS — Z12.31 ENCOUNTER FOR SCREENING MAMMOGRAM FOR MALIGNANT NEOPLASM OF BREAST: ICD-10-CM

## 2022-04-18 DIAGNOSIS — H35.3212 EXUDATIVE AGE-RELATED MACULAR DEGENERATION, RIGHT EYE, WITH INACTIVE CHOROIDAL NEOVASCULARIZATION (CMD): ICD-10-CM

## 2022-04-18 DIAGNOSIS — E78.2 MIXED HYPERLIPIDEMIA: ICD-10-CM

## 2022-04-18 PROCEDURE — 36415 COLL VENOUS BLD VENIPUNCTURE: CPT | Performed by: FAMILY MEDICINE

## 2022-04-18 PROCEDURE — 83036 HEMOGLOBIN GLYCOSYLATED A1C: CPT | Performed by: CLINICAL MEDICAL LABORATORY

## 2022-04-18 PROCEDURE — 99214 OFFICE O/P EST MOD 30 MIN: CPT | Performed by: FAMILY MEDICINE

## 2022-04-18 PROCEDURE — 85025 COMPLETE CBC W/AUTO DIFF WBC: CPT | Performed by: CLINICAL MEDICAL LABORATORY

## 2022-04-18 PROCEDURE — 80061 LIPID PANEL: CPT | Performed by: CLINICAL MEDICAL LABORATORY

## 2022-04-18 PROCEDURE — 82607 VITAMIN B-12: CPT | Performed by: CLINICAL MEDICAL LABORATORY

## 2022-04-18 PROCEDURE — 84443 ASSAY THYROID STIM HORMONE: CPT | Performed by: CLINICAL MEDICAL LABORATORY

## 2022-04-18 PROCEDURE — 82746 ASSAY OF FOLIC ACID SERUM: CPT | Performed by: CLINICAL MEDICAL LABORATORY

## 2022-04-18 RX ORDER — LISINOPRIL AND HYDROCHLOROTHIAZIDE 12.5; 1 MG/1; MG/1
1 TABLET ORAL DAILY
Qty: 90 TABLET | Refills: 3 | Status: SHIPPED | OUTPATIENT
Start: 2022-04-18 | End: 2022-04-20 | Stop reason: SDUPTHER

## 2022-04-18 ASSESSMENT — ENCOUNTER SYMPTOMS
SINUS PRESSURE: 0
CONFUSION: 1
LIGHT-HEADEDNESS: 0
NUMBNESS: 0
HEADACHES: 0
SHORTNESS OF BREATH: 0
WHEEZING: 0
CHILLS: 0
CONSTIPATION: 0
COUGH: 0
ABDOMINAL PAIN: 0
NAUSEA: 0
FATIGUE: 0
DIARRHEA: 0
VOMITING: 0
SLEEP DISTURBANCE: 0
DIZZINESS: 0
SINUS PAIN: 0
EYE ITCHING: 0
SORE THROAT: 0
WOUND: 0
EYE REDNESS: 0
EYE PAIN: 0
ADENOPATHY: 0
EYE DISCHARGE: 0
WEAKNESS: 0
TROUBLE SWALLOWING: 0
APPETITE CHANGE: 0
RHINORRHEA: 0
FEVER: 0

## 2022-04-18 ASSESSMENT — PATIENT HEALTH QUESTIONNAIRE - PHQ9
SUM OF ALL RESPONSES TO PHQ9 QUESTIONS 1 AND 2: 0
SUM OF ALL RESPONSES TO PHQ9 QUESTIONS 1 AND 2: 0
2. FEELING DOWN, DEPRESSED OR HOPELESS: NOT AT ALL
CLINICAL INTERPRETATION OF PHQ2 SCORE: NO FURTHER SCREENING NEEDED
1. LITTLE INTEREST OR PLEASURE IN DOING THINGS: NOT AT ALL

## 2022-04-19 LAB
BASOPHILS # BLD: 0.1 K/MCL (ref 0–0.3)
BASOPHILS NFR BLD: 1 %
CHOLEST SERPL-MCNC: 199 MG/DL
CHOLEST/HDLC SERPL: 3.6 {RATIO}
DEPRECATED RDW RBC: 46.6 FL (ref 39–50)
EOSINOPHIL # BLD: 0.1 K/MCL (ref 0–0.5)
EOSINOPHIL NFR BLD: 2 %
ERYTHROCYTE [DISTWIDTH] IN BLOOD: 12.5 % (ref 11–15)
FASTING DURATION TIME PATIENT: ABNORMAL H
FOLATE SERPL-MCNC: >24 NG/ML
HBA1C MFR BLD: 5.5 % (ref 4.5–5.6)
HCT VFR BLD CALC: 40.2 % (ref 36–46.5)
HDLC SERPL-MCNC: 56 MG/DL
HGB BLD-MCNC: 13.7 G/DL (ref 12–15.5)
IMM GRANULOCYTES # BLD AUTO: 0 K/MCL (ref 0–0.2)
IMM GRANULOCYTES # BLD: 0 %
LDLC SERPL CALC-MCNC: 112 MG/DL
LYMPHOCYTES # BLD: 2.1 K/MCL (ref 1–4)
LYMPHOCYTES NFR BLD: 40 %
MCH RBC QN AUTO: 34.2 PG (ref 26–34)
MCHC RBC AUTO-ENTMCNC: 34.1 G/DL (ref 32–36.5)
MCV RBC AUTO: 100.2 FL (ref 78–100)
MONOCYTES # BLD: 0.5 K/MCL (ref 0.3–0.9)
MONOCYTES NFR BLD: 10 %
NEUTROPHILS # BLD: 2.5 K/MCL (ref 1.8–7.7)
NEUTROPHILS NFR BLD: 47 %
NONHDLC SERPL-MCNC: 143 MG/DL
NRBC BLD MANUAL-RTO: 0 /100 WBC
PLATELET # BLD AUTO: 275 K/MCL (ref 140–450)
RBC # BLD: 4.01 MIL/MCL (ref 4–5.2)
TRIGL SERPL-MCNC: 154 MG/DL
TSH SERPL-ACNC: 2.31 MCUNITS/ML (ref 0.35–5)
VIT B12 SERPL-MCNC: 1094 PG/ML (ref 211–911)
WBC # BLD: 5.3 K/MCL (ref 4.2–11)

## 2022-04-20 RX ORDER — LISINOPRIL AND HYDROCHLOROTHIAZIDE 12.5; 1 MG/1; MG/1
1 TABLET ORAL DAILY
Qty: 90 TABLET | Refills: 3 | Status: SHIPPED | OUTPATIENT
Start: 2022-04-20 | End: 2022-06-29 | Stop reason: SDUPTHER

## 2022-04-27 ENCOUNTER — ADVANCED DIRECTIVES (OUTPATIENT)
Dept: HEALTH INFORMATION MANAGEMENT | Age: 77
End: 2022-04-27

## 2022-05-05 ENCOUNTER — APPOINTMENT (OUTPATIENT)
Dept: URBAN - METROPOLITAN AREA CLINIC 240 | Age: 77
Setting detail: DERMATOLOGY
End: 2022-05-05

## 2022-05-05 DIAGNOSIS — L82.0 INFLAMED SEBORRHEIC KERATOSIS: ICD-10-CM

## 2022-05-05 DIAGNOSIS — L57.0 ACTINIC KERATOSIS: ICD-10-CM

## 2022-05-05 DIAGNOSIS — L57.8 OTHER SKIN CHANGES DUE TO CHRONIC EXPOSURE TO NONIONIZING RADIATION: ICD-10-CM

## 2022-05-05 PROCEDURE — 17000 DESTRUCT PREMALG LESION: CPT

## 2022-05-05 PROCEDURE — OTHER ADDITIONAL NOTES: OTHER

## 2022-05-05 PROCEDURE — 17003 DESTRUCT PREMALG LES 2-14: CPT

## 2022-05-05 PROCEDURE — 99213 OFFICE O/P EST LOW 20 MIN: CPT | Mod: 25

## 2022-05-05 PROCEDURE — OTHER COUNSELING: OTHER

## 2022-05-05 PROCEDURE — OTHER LIQUID NITROGEN: OTHER

## 2022-05-05 ASSESSMENT — LOCATION SIMPLE DESCRIPTION DERM
LOCATION SIMPLE: RIGHT CHEEK
LOCATION SIMPLE: LEFT CHEEK
LOCATION SIMPLE: RIGHT HAND

## 2022-05-05 ASSESSMENT — LOCATION ZONE DERM
LOCATION ZONE: HAND
LOCATION ZONE: FACE

## 2022-05-05 ASSESSMENT — LOCATION DETAILED DESCRIPTION DERM
LOCATION DETAILED: RIGHT DORSAL RING FINGER METACARPOPHALANGEAL JOINT
LOCATION DETAILED: RIGHT ULNAR DORSAL HAND
LOCATION DETAILED: LEFT INFERIOR CENTRAL MALAR CHEEK
LOCATION DETAILED: RIGHT RADIAL DORSAL HAND
LOCATION DETAILED: RIGHT INFERIOR CENTRAL MALAR CHEEK

## 2022-05-05 NOTE — PROCEDURE: ADDITIONAL NOTES
Detail Level: Simple
Additional Notes: Previously treated with Ln2 on 03/31/22.
Render Risk Assessment In Note?: no
Additional Notes: Previously treated AKs on 3/31/22 resolved today( left cheek, right cheek, left jaw, right jaw) . New AKs seen today treated with LN2.

## 2022-06-16 ENCOUNTER — APPOINTMENT (OUTPATIENT)
Dept: URBAN - METROPOLITAN AREA CLINIC 240 | Age: 77
Setting detail: DERMATOLOGY
End: 2022-06-16

## 2022-06-16 DIAGNOSIS — L57.0 ACTINIC KERATOSIS: ICD-10-CM

## 2022-06-16 DIAGNOSIS — L57.8 OTHER SKIN CHANGES DUE TO CHRONIC EXPOSURE TO NONIONIZING RADIATION: ICD-10-CM

## 2022-06-16 PROCEDURE — OTHER ADDITIONAL NOTES: OTHER

## 2022-06-16 PROCEDURE — 99213 OFFICE O/P EST LOW 20 MIN: CPT

## 2022-06-16 PROCEDURE — OTHER COUNSELING: OTHER

## 2022-06-16 ASSESSMENT — LOCATION SIMPLE DESCRIPTION DERM
LOCATION SIMPLE: RIGHT HAND
LOCATION SIMPLE: LEFT CHEEK

## 2022-06-16 ASSESSMENT — LOCATION DETAILED DESCRIPTION DERM
LOCATION DETAILED: RIGHT ULNAR DORSAL HAND
LOCATION DETAILED: LEFT INFERIOR MEDIAL MALAR CHEEK

## 2022-06-16 ASSESSMENT — LOCATION ZONE DERM
LOCATION ZONE: HAND
LOCATION ZONE: FACE

## 2022-06-16 NOTE — PROCEDURE: ADDITIONAL NOTES
Additional Notes: AKs resolved on the right dorsal hand
Detail Level: Simple
Render Risk Assessment In Note?: no

## 2022-06-29 ENCOUNTER — TELEPHONE (OUTPATIENT)
Dept: FAMILY MEDICINE | Age: 77
End: 2022-06-29

## 2022-06-29 DIAGNOSIS — I10 ESSENTIAL HYPERTENSION: ICD-10-CM

## 2022-06-29 RX ORDER — LISINOPRIL AND HYDROCHLOROTHIAZIDE 12.5; 1 MG/1; MG/1
1 TABLET ORAL DAILY
Qty: 90 TABLET | Refills: 1 | Status: SHIPPED | OUTPATIENT
Start: 2022-06-29 | End: 2023-01-23 | Stop reason: SDUPTHER

## 2022-06-29 RX ORDER — LISINOPRIL AND HYDROCHLOROTHIAZIDE 12.5; 1 MG/1; MG/1
1 TABLET ORAL DAILY
Qty: 90 TABLET | Refills: 1 | Status: SHIPPED | OUTPATIENT
Start: 2022-06-29 | End: 2022-06-29 | Stop reason: SDUPTHER

## 2022-07-09 ENCOUNTER — APPOINTMENT (OUTPATIENT)
Dept: GENERAL RADIOLOGY | Age: 77
End: 2022-07-09
Attending: EMERGENCY MEDICINE

## 2022-07-09 ENCOUNTER — HOSPITAL ENCOUNTER (EMERGENCY)
Age: 77
Discharge: HOME | End: 2022-07-09
Attending: EMERGENCY MEDICINE

## 2022-07-09 VITALS
HEIGHT: 60 IN | SYSTOLIC BLOOD PRESSURE: 146 MMHG | WEIGHT: 135 LBS | DIASTOLIC BLOOD PRESSURE: 78 MMHG | TEMPERATURE: 97.7 F | BODY MASS INDEX: 26.5 KG/M2 | OXYGEN SATURATION: 100 % | HEART RATE: 60 BPM

## 2022-07-09 DIAGNOSIS — M79.671 RIGHT FOOT PAIN: Primary | ICD-10-CM

## 2022-07-09 PROCEDURE — 73630 X-RAY EXAM OF FOOT: CPT

## 2022-07-09 PROCEDURE — 99283 EMERGENCY DEPT VISIT LOW MDM: CPT

## 2022-07-09 ASSESSMENT — PAIN SCALES - GENERAL: PAINLEVEL_OUTOF10: 9

## 2022-07-18 ENCOUNTER — TELEPHONE (OUTPATIENT)
Dept: SCHEDULING | Age: 77
End: 2022-07-18

## 2022-07-19 ENCOUNTER — OFFICE VISIT (OUTPATIENT)
Dept: URGENT CARE | Age: 77
End: 2022-07-19

## 2022-07-19 VITALS
BODY MASS INDEX: 23.74 KG/M2 | TEMPERATURE: 98.4 F | HEIGHT: 63 IN | RESPIRATION RATE: 18 BRPM | OXYGEN SATURATION: 97 % | WEIGHT: 134 LBS | SYSTOLIC BLOOD PRESSURE: 90 MMHG | HEART RATE: 68 BPM | DIASTOLIC BLOOD PRESSURE: 60 MMHG

## 2022-07-19 DIAGNOSIS — U07.1 COVID-19 VIRUS INFECTION: Primary | ICD-10-CM

## 2022-07-19 PROCEDURE — 99213 OFFICE O/P EST LOW 20 MIN: CPT | Performed by: NURSE PRACTITIONER

## 2022-07-19 ASSESSMENT — ENCOUNTER SYMPTOMS
GASTROINTESTINAL NEGATIVE: 1
FEVER: 0
ENDOCRINE NEGATIVE: 1
HEADACHES: 1
RHINORRHEA: 1
NAUSEA: 0
SORE THROAT: 0
COUGH: 0
RESPIRATORY NEGATIVE: 1
VOMITING: 0
PSYCHIATRIC NEGATIVE: 1
SHORTNESS OF BREATH: 0
HEMATOLOGIC/LYMPHATIC NEGATIVE: 1
EYES NEGATIVE: 1

## 2022-08-08 ENCOUNTER — EXTERNAL RECORD (OUTPATIENT)
Dept: HEALTH INFORMATION MANAGEMENT | Facility: OTHER | Age: 77
End: 2022-08-08

## 2022-10-17 ENCOUNTER — OFFICE VISIT (OUTPATIENT)
Dept: FAMILY MEDICINE | Age: 77
End: 2022-10-17

## 2022-10-17 ENCOUNTER — TELEPHONE (OUTPATIENT)
Dept: SCHEDULING | Age: 77
End: 2022-10-17

## 2022-10-17 VITALS
BODY MASS INDEX: 23.71 KG/M2 | TEMPERATURE: 96.6 F | HEIGHT: 63 IN | DIASTOLIC BLOOD PRESSURE: 60 MMHG | HEART RATE: 68 BPM | WEIGHT: 133.8 LBS | SYSTOLIC BLOOD PRESSURE: 110 MMHG | OXYGEN SATURATION: 98 %

## 2022-10-17 DIAGNOSIS — J30.2 SEASONAL ALLERGIES: ICD-10-CM

## 2022-10-17 DIAGNOSIS — H93.13 TINNITUS OF BOTH EARS: Primary | ICD-10-CM

## 2022-10-17 DIAGNOSIS — I10 PRIMARY HYPERTENSION: ICD-10-CM

## 2022-10-17 PROBLEM — R59.0 LYMPHADENOPATHY OF LEFT CERVICAL REGION: Status: RESOLVED | Noted: 2018-12-24 | Resolved: 2022-10-17

## 2022-10-17 PROCEDURE — 99213 OFFICE O/P EST LOW 20 MIN: CPT | Performed by: FAMILY MEDICINE

## 2022-10-17 ASSESSMENT — ENCOUNTER SYMPTOMS
CHILLS: 0
HEADACHES: 0
FEVER: 0
SHORTNESS OF BREATH: 0
SORE THROAT: 0
DIZZINESS: 0
ADENOPATHY: 0
TROUBLE SWALLOWING: 0
COUGH: 0
VOMITING: 0
RHINORRHEA: 0
NAUSEA: 0

## 2022-10-17 ASSESSMENT — PATIENT HEALTH QUESTIONNAIRE - PHQ9
1. LITTLE INTEREST OR PLEASURE IN DOING THINGS: NOT AT ALL
CLINICAL INTERPRETATION OF PHQ2 SCORE: NO FURTHER SCREENING NEEDED
2. FEELING DOWN, DEPRESSED OR HOPELESS: NOT AT ALL
SUM OF ALL RESPONSES TO PHQ9 QUESTIONS 1 AND 2: 0
SUM OF ALL RESPONSES TO PHQ9 QUESTIONS 1 AND 2: 0

## 2022-10-18 ENCOUNTER — TELEPHONE (OUTPATIENT)
Dept: SCHEDULING | Age: 77
End: 2022-10-18

## 2022-10-18 DIAGNOSIS — H93.13 TINNITUS OF BOTH EARS: Primary | ICD-10-CM

## 2022-10-24 ENCOUNTER — EXTERNAL RECORD (OUTPATIENT)
Dept: HEALTH INFORMATION MANAGEMENT | Facility: OTHER | Age: 77
End: 2022-10-24

## 2022-11-14 ENCOUNTER — EXTERNAL RECORD (OUTPATIENT)
Dept: HEALTH INFORMATION MANAGEMENT | Facility: OTHER | Age: 77
End: 2022-11-14

## 2022-12-01 ENCOUNTER — APPOINTMENT (OUTPATIENT)
Dept: URBAN - METROPOLITAN AREA CLINIC 240 | Age: 77
Setting detail: DERMATOLOGY
End: 2022-12-01

## 2022-12-01 DIAGNOSIS — L82.1 OTHER SEBORRHEIC KERATOSIS: ICD-10-CM

## 2022-12-01 DIAGNOSIS — D22 MELANOCYTIC NEVI: ICD-10-CM

## 2022-12-01 DIAGNOSIS — Z71.89 OTHER SPECIFIED COUNSELING: ICD-10-CM

## 2022-12-01 DIAGNOSIS — L57.0 ACTINIC KERATOSIS: ICD-10-CM

## 2022-12-01 DIAGNOSIS — D18.0 HEMANGIOMA: ICD-10-CM

## 2022-12-01 DIAGNOSIS — L85.3 XEROSIS CUTIS: ICD-10-CM

## 2022-12-01 DIAGNOSIS — L81.4 OTHER MELANIN HYPERPIGMENTATION: ICD-10-CM

## 2022-12-01 PROBLEM — D22.71 MELANOCYTIC NEVI OF RIGHT LOWER LIMB, INCLUDING HIP: Status: ACTIVE | Noted: 2022-12-01

## 2022-12-01 PROBLEM — D22.72 MELANOCYTIC NEVI OF LEFT LOWER LIMB, INCLUDING HIP: Status: ACTIVE | Noted: 2022-12-01

## 2022-12-01 PROBLEM — D18.01 HEMANGIOMA OF SKIN AND SUBCUTANEOUS TISSUE: Status: ACTIVE | Noted: 2022-12-01

## 2022-12-01 PROBLEM — D22.5 MELANOCYTIC NEVI OF TRUNK: Status: ACTIVE | Noted: 2022-12-01

## 2022-12-01 PROBLEM — D22.62 MELANOCYTIC NEVI OF LEFT UPPER LIMB, INCLUDING SHOULDER: Status: ACTIVE | Noted: 2022-12-01

## 2022-12-01 PROBLEM — D22.61 MELANOCYTIC NEVI OF RIGHT UPPER LIMB, INCLUDING SHOULDER: Status: ACTIVE | Noted: 2022-12-01

## 2022-12-01 PROCEDURE — OTHER COUNSELING: OTHER

## 2022-12-01 PROCEDURE — 17003 DESTRUCT PREMALG LES 2-14: CPT

## 2022-12-01 PROCEDURE — OTHER LIQUID NITROGEN: OTHER

## 2022-12-01 PROCEDURE — OTHER MIPS QUALITY: OTHER

## 2022-12-01 PROCEDURE — 17000 DESTRUCT PREMALG LESION: CPT

## 2022-12-01 PROCEDURE — 99213 OFFICE O/P EST LOW 20 MIN: CPT | Mod: 25

## 2022-12-01 ASSESSMENT — LOCATION DETAILED DESCRIPTION DERM
LOCATION DETAILED: NASAL ROOT
LOCATION DETAILED: PERIUMBILICAL SKIN
LOCATION DETAILED: INFERIOR MID FOREHEAD
LOCATION DETAILED: STERNUM
LOCATION DETAILED: RIGHT INFERIOR FOREHEAD
LOCATION DETAILED: RIGHT PROXIMAL CALF
LOCATION DETAILED: LEFT CENTRAL MALAR CHEEK
LOCATION DETAILED: LEFT PROXIMAL CALF
LOCATION DETAILED: RIGHT SUPERIOR MEDIAL UPPER BACK
LOCATION DETAILED: RIGHT LATERAL ELBOW
LOCATION DETAILED: LEFT ELBOW
LOCATION DETAILED: RIGHT INFERIOR MEDIAL UPPER BACK

## 2022-12-01 ASSESSMENT — LOCATION SIMPLE DESCRIPTION DERM
LOCATION SIMPLE: RIGHT FOREHEAD
LOCATION SIMPLE: RIGHT ELBOW
LOCATION SIMPLE: CHEST
LOCATION SIMPLE: LEFT ELBOW
LOCATION SIMPLE: ABDOMEN
LOCATION SIMPLE: INFERIOR FOREHEAD
LOCATION SIMPLE: LEFT CALF
LOCATION SIMPLE: RIGHT CALF
LOCATION SIMPLE: LEFT CHEEK
LOCATION SIMPLE: RIGHT UPPER BACK
LOCATION SIMPLE: NOSE

## 2022-12-01 ASSESSMENT — LOCATION ZONE DERM
LOCATION ZONE: TRUNK
LOCATION ZONE: NOSE
LOCATION ZONE: FACE
LOCATION ZONE: LEG
LOCATION ZONE: ARM

## 2022-12-01 NOTE — PROCEDURE: COUNSELING
Detail Level: Zone
Bleaching Agents Recommendations: Rec Adam from [facility]
Sunscreen Recommendations: Yes
Detail Level: Generalized
Detail Level: Detailed
Patient Specific Counseling (Will Not Stick From Patient To Patient): Avoid bar soaps. Recommended using Cetaphil cleanser and moisturizing cream without any fragrance.

## 2022-12-01 NOTE — PROCEDURE: MIPS QUALITY
Detail Level: Detailed
Quality 226: Preventive Care And Screening: Tobacco Use: Screening And Cessation Intervention: Patient screened for tobacco use and is an ex/non-smoker
Quality 431: Preventive Care And Screening: Unhealthy Alcohol Use - Screening: Patient not identified as an unhealthy alcohol user when screened for unhealthy alcohol use using a systematic screening method
Quality 110: Preventive Care And Screening: Influenza Immunization: Influenza Immunization previously received during influenza season
Quality 130: Documentation Of Current Medications In The Medical Record: Current Medications Documented
Quality 111:Pneumonia Vaccination Status For Older Adults: Pneumococcal vaccine (PPSV23) administered on or after patient’s 60th birthday and before the end of the measurement period

## 2023-01-20 ENCOUNTER — APPOINTMENT (OUTPATIENT)
Dept: URBAN - METROPOLITAN AREA CLINIC 240 | Age: 78
Setting detail: DERMATOLOGY
End: 2023-01-20

## 2023-01-20 DIAGNOSIS — L57.0 ACTINIC KERATOSIS: ICD-10-CM

## 2023-01-20 PROCEDURE — OTHER COUNSELING: OTHER

## 2023-01-20 PROCEDURE — 99212 OFFICE O/P EST SF 10 MIN: CPT

## 2023-01-20 PROCEDURE — OTHER MIPS QUALITY: OTHER

## 2023-01-20 ASSESSMENT — LOCATION SIMPLE DESCRIPTION DERM
LOCATION SIMPLE: NOSE
LOCATION SIMPLE: INFERIOR FOREHEAD
LOCATION SIMPLE: RIGHT FOREHEAD

## 2023-01-20 ASSESSMENT — LOCATION DETAILED DESCRIPTION DERM
LOCATION DETAILED: INFERIOR MID FOREHEAD
LOCATION DETAILED: RIGHT INFERIOR FOREHEAD
LOCATION DETAILED: NASAL ROOT

## 2023-01-20 ASSESSMENT — LOCATION ZONE DERM
LOCATION ZONE: NOSE
LOCATION ZONE: FACE

## 2023-01-20 NOTE — PROCEDURE: MIPS QUALITY
Quality 431: Preventive Care And Screening: Unhealthy Alcohol Use - Screening: Patient not identified as an unhealthy alcohol user when screened for unhealthy alcohol use using a systematic screening method
Detail Level: Detailed
Quality 110: Preventive Care And Screening: Influenza Immunization: Influenza Immunization Administered during Influenza season
Quality 226: Preventive Care And Screening: Tobacco Use: Screening And Cessation Intervention: Patient screened for tobacco use and is an ex/non-smoker
Quality 130: Documentation Of Current Medications In The Medical Record: Current Medications Documented
Quality 111:Pneumonia Vaccination Status For Older Adults: Pneumococcal vaccine (PPSV23) administered on or after patient’s 60th birthday and before the end of the measurement period

## 2023-01-23 DIAGNOSIS — I10 ESSENTIAL HYPERTENSION: ICD-10-CM

## 2023-01-23 RX ORDER — LISINOPRIL AND HYDROCHLOROTHIAZIDE 12.5; 1 MG/1; MG/1
1 TABLET ORAL DAILY
Qty: 90 TABLET | Refills: 1 | Status: SHIPPED | OUTPATIENT
Start: 2023-01-23 | End: 2023-08-14 | Stop reason: SDUPTHER

## 2023-01-25 ENCOUNTER — TELEPHONE (OUTPATIENT)
Dept: FAMILY MEDICINE | Age: 78
End: 2023-01-25

## 2023-03-07 ENCOUNTER — TELEPHONE (OUTPATIENT)
Dept: FAMILY MEDICINE | Age: 78
End: 2023-03-07

## 2023-03-13 ENCOUNTER — EXTERNAL RECORD (OUTPATIENT)
Dept: OTHER | Age: 78
End: 2023-03-13

## 2023-05-08 RX ORDER — FLUTICASONE PROPIONATE 50 MCG
2 SPRAY, SUSPENSION (ML) NASAL DAILY
Qty: 9.9 ML | Refills: 0 | Status: SHIPPED | OUTPATIENT
Start: 2023-05-08 | End: 2026-07-09

## 2023-05-18 ENCOUNTER — HOSPITAL ENCOUNTER (EMERGENCY)
Age: 78
Discharge: HOME | End: 2023-05-18
Attending: EMERGENCY MEDICINE

## 2023-05-18 VITALS
OXYGEN SATURATION: 97 % | WEIGHT: 133.82 LBS | HEART RATE: 64 BPM | DIASTOLIC BLOOD PRESSURE: 84 MMHG | TEMPERATURE: 98.6 F | HEIGHT: 61 IN | RESPIRATION RATE: 16 BRPM | SYSTOLIC BLOOD PRESSURE: 138 MMHG | BODY MASS INDEX: 25.27 KG/M2

## 2023-05-18 DIAGNOSIS — S00.86XA INSECT BITE OF FACE WITHOUT INFECTION: Primary | ICD-10-CM
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
## 2023-05-18 PROCEDURE — 99282 EMERGENCY DEPT VISIT SF MDM: CPT

## 2023-05-18 ASSESSMENT — PAIN SCALES - GENERAL: PAINLEVEL_OUTOF10: 0

## 2023-05-19 ENCOUNTER — NURSE TRIAGE (OUTPATIENT)
Dept: TELEHEALTH | Age: 78
End: 2023-05-19

## 2023-06-11 ENCOUNTER — NURSE TRIAGE (OUTPATIENT)
Dept: TELEHEALTH | Age: 78
End: 2023-06-11

## 2023-06-11 ENCOUNTER — TELEPHONE (OUTPATIENT)
Dept: FAMILY MEDICINE | Age: 78
End: 2023-06-11

## 2023-06-13 ENCOUNTER — OFFICE VISIT (OUTPATIENT)
Dept: FAMILY MEDICINE | Age: 78
End: 2023-06-13

## 2023-06-13 VITALS
WEIGHT: 133 LBS | HEART RATE: 58 BPM | DIASTOLIC BLOOD PRESSURE: 76 MMHG | BODY MASS INDEX: 23.57 KG/M2 | SYSTOLIC BLOOD PRESSURE: 120 MMHG | HEIGHT: 63 IN | RESPIRATION RATE: 18 BRPM

## 2023-06-13 DIAGNOSIS — Z00.00 MEDICARE ANNUAL WELLNESS VISIT, SUBSEQUENT: ICD-10-CM

## 2023-06-13 DIAGNOSIS — M25.551 PAIN OF RIGHT HIP: Primary | ICD-10-CM

## 2023-06-13 PROCEDURE — 99213 OFFICE O/P EST LOW 20 MIN: CPT | Performed by: FAMILY MEDICINE

## 2023-06-13 PROCEDURE — G0439 PPPS, SUBSEQ VISIT: HCPCS | Performed by: FAMILY MEDICINE

## 2023-06-13 ASSESSMENT — COGNITIVE AND FUNCTIONAL STATUS - GENERAL
BECAUSE OF A PHYSICAL, MENTAL, OR EMOTIONAL CONDITION, DO YOU HAVE DIFFICULTY DOING ERRANDS ALONE: NO
DO YOU HAVE DIFFICULTY DRESSING OR BATHING: NO
DO YOU HAVE SERIOUS DIFFICULTY WALKING OR CLIMBING STAIRS: NO
BECAUSE OF A PHYSICAL, MENTAL, OR EMOTIONAL CONDITION, DO YOU HAVE SERIOUS DIFFICULTY CONCENTRATING, REMEMBERING OR MAKING DECISIONS: NO

## 2023-06-13 ASSESSMENT — ENCOUNTER SYMPTOMS
DIAPHORESIS: 0
SLEEP DISTURBANCE: 0
BACK PAIN: 1
COLOR CHANGE: 0
WEAKNESS: 0
FEVER: 0
WOUND: 0
NUMBNESS: 0
FATIGUE: 0
CHILLS: 0

## 2023-06-13 ASSESSMENT — PATIENT HEALTH QUESTIONNAIRE - PHQ9
1. LITTLE INTEREST OR PLEASURE IN DOING THINGS: NOT AT ALL
2. FEELING DOWN, DEPRESSED OR HOPELESS: NOT AT ALL
SUM OF ALL RESPONSES TO PHQ9 QUESTIONS 1 AND 2: 0
CLINICAL INTERPRETATION OF PHQ2 SCORE: NO FURTHER SCREENING NEEDED
SUM OF ALL RESPONSES TO PHQ9 QUESTIONS 1 AND 2: 0

## 2023-06-13 ASSESSMENT — MINI COG
PATIENT ABLE TO FILL IN THE CLOCK FACE WITH 10 MINUTES PAST 11 O'CLOCK?: YES, CLOCK IS CORRECT
PATIENT WAS GIVEN REPEAT BACK WORDS FROM VERSION: 3 - VILLAGE KITCHEN BABY
TOTAL SCORE: 5
PATIENT ABLE TO REPEAT THE 3 WORDS GIVEN PREVIOUSLY?: WAS ABLE TO REPEAT BACK 3 WORDS CORRECTLY

## 2023-06-15 ENCOUNTER — HOSPITAL ENCOUNTER (OUTPATIENT)
Dept: PHYSICAL MEDICINE AND REHAB | Age: 78
Discharge: STILL ADMITTED OR ADMINISTRATIVE | End: 2023-06-15
Attending: FAMILY MEDICINE

## 2023-06-15 PROCEDURE — 97161 PT EVAL LOW COMPLEX 20 MIN: CPT | Performed by: PHYSICAL THERAPIST

## 2023-06-15 PROCEDURE — 97110 THERAPEUTIC EXERCISES: CPT | Performed by: PHYSICAL THERAPIST

## 2023-06-15 ASSESSMENT — MOVEMENT AND STRENGTH ASSESSMENTS
SITTING FOR 1 HOUR: A LITTLE BIT OF DIFFICULTY
RUNNING ON UNEVEN GROUND: QUITE A BIT OF DIFFICULTY
PERFORMING HEAVY ACTIVITIES AROUND YOUR HOME: A LITTLE BIT OF DIFFICULTY
GETTING INTO OR OUT OF A CAR: A LITTLE BIT OF DIFFICULTY
LIFTING AN OBJECT, LIKE A BAG OF GROCERIES, FROM THE FLOOR: A LITTLE BIT OF DIFFICULTY
PERFORMING LIGHT ACTIVITES AROUND YOUR HOME: MODERATE DIFFICULTY
WALKING BETWEEN ROOMS: A LITTLE BIT OF DIFFICULTY
ANY OF YOUR USUAL WORK, HOUSEWORK OR SCHOOL ACTIVITIES: MODERATE DIFFICULTY
MAKING SHARP TURNS WHILE RUNNING FAST: QUITE A BIT OF DIFFICULTY
GOING UP OR DOWN 10 STAIRS (ABOUT 1 FLIGHT OF STAIRS): A LITTLE BIT OF DIFFICULTY
GETTING INTO OR OUT OF THE BATH: NO DIFFICULTY
HOPPING: QUITE A BIT OF DIFFICULTY
PUTTING ON YOUR SHOES OR SOCKS: MODERATE DIFFICULTY
WALKING 2 BLOCKS: A LITTLE BIT OF DIFFICULTY
ROLLING OVER IN BED: NO DIFFICULTY
WALKING A MILE: QUITE A BIT OF DIFFICULTY
YOUR USUAL HOBBIES, RECREATIONAL OR SPORTING ACTIVIITIES: QUITE A BIT OF DIFFICULTY
SQUATTING: A LITTLE BIT OF DIFFICULTY
STANDING FOR 1 HOUR: MODERATE DIFFICULTY
TOTAL SCORE: 57.5
RUNNING ON EVEN GROUND: QUITE A BIT OF DIFFICULTY

## 2023-06-15 ASSESSMENT — ENCOUNTER SYMPTOMS
PAIN SCALE AT HIGHEST: 3
PAIN SEVERITY NOW: 5
ALLEVIATING FACTORS: OVER-THE-COUNTER MEDICATION
PAIN SCALE AT LOWEST: 7
QUALITY: ACHE
ALLEVIATING FACTORS: HEAT
QUALITY: SHARP
PAIN FREQUENCY: CONSTANT

## 2023-06-21 ENCOUNTER — APPOINTMENT (OUTPATIENT)
Dept: PHYSICAL MEDICINE AND REHAB | Age: 78
End: 2023-06-21
Attending: FAMILY MEDICINE

## 2023-06-22 ENCOUNTER — HOSPITAL ENCOUNTER (OUTPATIENT)
Dept: PHYSICAL MEDICINE AND REHAB | Age: 78
Discharge: STILL ADMITTED OR ADMINISTRATIVE | End: 2023-06-22
Attending: FAMILY MEDICINE

## 2023-06-22 PROCEDURE — 97140 MANUAL THERAPY 1/> REGIONS: CPT | Performed by: PHYSICAL THERAPIST

## 2023-06-22 PROCEDURE — 97110 THERAPEUTIC EXERCISES: CPT | Performed by: PHYSICAL THERAPIST

## 2023-06-22 ASSESSMENT — ENCOUNTER SYMPTOMS
ALLEVIATING FACTORS: HEAT
PAIN SCALE AT LOWEST: 7
PAIN FREQUENCY: CONSTANT
ALLEVIATING FACTORS: OVER-THE-COUNTER MEDICATION
PAIN SEVERITY NOW: 5
PAIN SCALE AT HIGHEST: 3
QUALITY: ACHE
QUALITY: SHARP

## 2023-06-26 ENCOUNTER — HOSPITAL ENCOUNTER (OUTPATIENT)
Dept: PHYSICAL MEDICINE AND REHAB | Age: 78
Discharge: HOME | End: 2023-06-26
Attending: FAMILY MEDICINE

## 2023-06-28 ENCOUNTER — APPOINTMENT (OUTPATIENT)
Dept: PHYSICAL MEDICINE AND REHAB | Age: 78
End: 2023-06-28
Attending: FAMILY MEDICINE

## 2023-08-14 ENCOUNTER — OFFICE VISIT (OUTPATIENT)
Dept: FAMILY MEDICINE | Age: 78
End: 2023-08-14

## 2023-08-14 VITALS
HEIGHT: 63 IN | SYSTOLIC BLOOD PRESSURE: 102 MMHG | DIASTOLIC BLOOD PRESSURE: 60 MMHG | WEIGHT: 129.7 LBS | BODY MASS INDEX: 22.98 KG/M2 | OXYGEN SATURATION: 98 % | TEMPERATURE: 97.5 F | HEART RATE: 64 BPM

## 2023-08-14 DIAGNOSIS — M54.31 SCIATICA OF RIGHT SIDE: Primary | ICD-10-CM

## 2023-08-14 DIAGNOSIS — M25.551 ACUTE RIGHT HIP PAIN: ICD-10-CM

## 2023-08-14 DIAGNOSIS — I10 ESSENTIAL HYPERTENSION: ICD-10-CM

## 2023-08-14 DIAGNOSIS — M79.604 RIGHT LEG PAIN: ICD-10-CM

## 2023-08-14 PROCEDURE — 99214 OFFICE O/P EST MOD 30 MIN: CPT | Performed by: FAMILY MEDICINE

## 2023-08-14 RX ORDER — ACETAMINOPHEN 500 MG
500 TABLET ORAL EVERY 6 HOURS PRN
Qty: 10 TABLET | Refills: 0 | COMMUNITY
Start: 2023-08-14

## 2023-08-14 RX ORDER — LIDOCAINE 50 MG/G
PATCH TOPICAL
COMMUNITY
Start: 2023-06-20 | End: 2023-08-14 | Stop reason: ALTCHOICE

## 2023-08-14 RX ORDER — MELOXICAM 15 MG/1
15 TABLET ORAL
Qty: 15 TABLET | Refills: 0 | Status: SHIPPED | OUTPATIENT
Start: 2023-08-14 | End: 2023-08-14 | Stop reason: DRUGHIGH

## 2023-08-14 RX ORDER — MELOXICAM 15 MG/1
15 TABLET ORAL
Qty: 30 TABLET | Refills: 0 | Status: SHIPPED | OUTPATIENT
Start: 2023-08-14

## 2023-08-14 RX ORDER — MELOXICAM 7.5 MG/1
TABLET ORAL
COMMUNITY
Start: 2023-06-20 | End: 2023-08-14 | Stop reason: ALTCHOICE

## 2023-08-14 RX ORDER — LISINOPRIL AND HYDROCHLOROTHIAZIDE 12.5; 1 MG/1; MG/1
1 TABLET ORAL DAILY
Qty: 90 TABLET | Refills: 1 | Status: SHIPPED | OUTPATIENT
Start: 2023-08-14 | End: 2023-08-19 | Stop reason: SDUPTHER

## 2023-08-14 RX ORDER — CYCLOBENZAPRINE HCL 5 MG
TABLET ORAL
COMMUNITY
Start: 2023-06-20 | End: 2023-08-14 | Stop reason: ALTCHOICE

## 2023-08-14 ASSESSMENT — PATIENT HEALTH QUESTIONNAIRE - PHQ9
2. FEELING DOWN, DEPRESSED OR HOPELESS: NOT AT ALL
1. LITTLE INTEREST OR PLEASURE IN DOING THINGS: NOT AT ALL
SUM OF ALL RESPONSES TO PHQ9 QUESTIONS 1 AND 2: 0
SUM OF ALL RESPONSES TO PHQ9 QUESTIONS 1 AND 2: 0
CLINICAL INTERPRETATION OF PHQ2 SCORE: NO FURTHER SCREENING NEEDED

## 2023-08-14 ASSESSMENT — ENCOUNTER SYMPTOMS
VOMITING: 0
CHILLS: 0
TREMORS: 0
SORE THROAT: 0
TROUBLE SWALLOWING: 0
FEVER: 0
NUMBNESS: 0
NAUSEA: 0

## 2023-08-15 DIAGNOSIS — M79.604 RIGHT LEG PAIN: ICD-10-CM

## 2023-08-15 DIAGNOSIS — M54.31 SCIATICA OF RIGHT SIDE: Primary | ICD-10-CM

## 2023-08-15 DIAGNOSIS — M25.551 ACUTE RIGHT HIP PAIN: ICD-10-CM

## 2023-08-17 ENCOUNTER — TELEPHONE (OUTPATIENT)
Dept: FAMILY MEDICINE | Age: 78
End: 2023-08-17

## 2023-08-19 DIAGNOSIS — I10 ESSENTIAL HYPERTENSION: ICD-10-CM

## 2023-08-19 RX ORDER — LISINOPRIL AND HYDROCHLOROTHIAZIDE 12.5; 1 MG/1; MG/1
1 TABLET ORAL DAILY
Qty: 90 TABLET | Refills: 1 | Status: SHIPPED | OUTPATIENT
Start: 2023-08-19 | End: 2023-08-24 | Stop reason: SDUPTHER

## 2023-08-24 RX ORDER — LISINOPRIL AND HYDROCHLOROTHIAZIDE 12.5; 1 MG/1; MG/1
1 TABLET ORAL DAILY
Qty: 90 TABLET | Refills: 1 | Status: SHIPPED | OUTPATIENT
Start: 2023-08-24 | End: 2024-02-20

## 2023-11-03 ENCOUNTER — APPOINTMENT (OUTPATIENT)
Dept: FAMILY MEDICINE | Age: 78
End: 2023-11-03

## 2023-11-09 ENCOUNTER — EXTERNAL RECORD (OUTPATIENT)
Dept: HEALTH INFORMATION MANAGEMENT | Facility: OTHER | Age: 78
End: 2023-11-09

## 2023-11-10 ENCOUNTER — TELEPHONE (OUTPATIENT)
Dept: FAMILY MEDICINE | Age: 78
End: 2023-11-10

## 2023-11-10 ENCOUNTER — IMMUNIZATION (OUTPATIENT)
Dept: FAMILY MEDICINE | Age: 78
End: 2023-11-10

## 2023-11-10 DIAGNOSIS — Z23 NEED FOR INFLUENZA VACCINATION: Primary | ICD-10-CM

## 2023-11-10 PROCEDURE — G0008 ADMIN INFLUENZA VIRUS VAC: HCPCS | Performed by: FAMILY MEDICINE

## 2023-11-10 PROCEDURE — 90662 IIV NO PRSV INCREASED AG IM: CPT | Performed by: FAMILY MEDICINE

## 2023-11-15 ENCOUNTER — TELEPHONE (OUTPATIENT)
Dept: FAMILY MEDICINE | Age: 78
End: 2023-11-15

## 2023-11-21 ENCOUNTER — APPOINTMENT (OUTPATIENT)
Dept: FAMILY MEDICINE | Age: 78
End: 2023-11-21

## 2023-12-11 ENCOUNTER — EXTERNAL RECORD (OUTPATIENT)
Dept: HEALTH INFORMATION MANAGEMENT | Facility: OTHER | Age: 78
End: 2023-12-11

## 2024-01-11 ENCOUNTER — APPOINTMENT (OUTPATIENT)
Dept: FAMILY MEDICINE | Age: 79
End: 2024-01-11

## 2024-01-11 VITALS
TEMPERATURE: 97.8 F | WEIGHT: 129.6 LBS | DIASTOLIC BLOOD PRESSURE: 78 MMHG | SYSTOLIC BLOOD PRESSURE: 120 MMHG | HEIGHT: 62 IN | BODY MASS INDEX: 23.85 KG/M2 | HEART RATE: 71 BPM | OXYGEN SATURATION: 97 %

## 2024-01-11 DIAGNOSIS — E55.9 VITAMIN D DEFICIENCY: ICD-10-CM

## 2024-01-11 DIAGNOSIS — R41.3 MEMORY LOSS: Primary | ICD-10-CM

## 2024-01-11 PROCEDURE — 84443 ASSAY THYROID STIM HORMONE: CPT | Performed by: CLINICAL MEDICAL LABORATORY

## 2024-01-11 PROCEDURE — 36415 COLL VENOUS BLD VENIPUNCTURE: CPT

## 2024-01-11 PROCEDURE — 82306 VITAMIN D 25 HYDROXY: CPT | Performed by: CLINICAL MEDICAL LABORATORY

## 2024-01-11 PROCEDURE — 85025 COMPLETE CBC W/AUTO DIFF WBC: CPT | Performed by: CLINICAL MEDICAL LABORATORY

## 2024-01-11 PROCEDURE — 82607 VITAMIN B-12: CPT | Performed by: CLINICAL MEDICAL LABORATORY

## 2024-01-11 PROCEDURE — 99213 OFFICE O/P EST LOW 20 MIN: CPT

## 2024-01-11 PROCEDURE — 80053 COMPREHEN METABOLIC PANEL: CPT | Performed by: CLINICAL MEDICAL LABORATORY

## 2024-01-11 PROCEDURE — 82746 ASSAY OF FOLIC ACID SERUM: CPT | Performed by: CLINICAL MEDICAL LABORATORY

## 2024-01-11 ASSESSMENT — PATIENT HEALTH QUESTIONNAIRE - PHQ9
SUM OF ALL RESPONSES TO PHQ9 QUESTIONS 1 AND 2: 0
CLINICAL INTERPRETATION OF PHQ2 SCORE: NO FURTHER SCREENING NEEDED
SUM OF ALL RESPONSES TO PHQ9 QUESTIONS 1 AND 2: 0
2. FEELING DOWN, DEPRESSED OR HOPELESS: NOT AT ALL
1. LITTLE INTEREST OR PLEASURE IN DOING THINGS: NOT AT ALL

## 2024-01-11 ASSESSMENT — ENCOUNTER SYMPTOMS
NEUROLOGICAL NEGATIVE: 1
RESPIRATORY NEGATIVE: 1
SLEEP DISTURBANCE: 1
CONSTITUTIONAL NEGATIVE: 1
NERVOUS/ANXIOUS: 0

## 2024-01-11 ASSESSMENT — PAIN SCALES - GENERAL: PAINLEVEL: 0

## 2024-01-12 LAB
25(OH)D3+25(OH)D2 SERPL-MCNC: 48.1 NG/ML (ref 30–100)
ALBUMIN SERPL-MCNC: 3.9 G/DL (ref 3.6–5.1)
ALBUMIN/GLOB SERPL: 1.3 {RATIO} (ref 1–2.4)
ALP SERPL-CCNC: 71 UNITS/L (ref 45–117)
ALT SERPL-CCNC: 23 UNITS/L
ANION GAP SERPL CALC-SCNC: 9 MMOL/L (ref 7–19)
AST SERPL-CCNC: 15 UNITS/L
BASOPHILS # BLD: 0 K/MCL (ref 0–0.3)
BASOPHILS NFR BLD: 1 %
BILIRUB SERPL-MCNC: 0.4 MG/DL (ref 0.2–1)
BUN SERPL-MCNC: 15 MG/DL (ref 6–20)
BUN/CREAT SERPL: 24 (ref 7–25)
CALCIUM SERPL-MCNC: 9.5 MG/DL (ref 8.4–10.2)
CHLORIDE SERPL-SCNC: 105 MMOL/L (ref 97–110)
CO2 SERPL-SCNC: 28 MMOL/L (ref 21–32)
CREAT SERPL-MCNC: 0.62 MG/DL (ref 0.51–0.95)
DEPRECATED RDW RBC: 46.1 FL (ref 39–50)
EGFRCR SERPLBLD CKD-EPI 2021: >90 ML/MIN/{1.73_M2}
EOSINOPHIL # BLD: 0 K/MCL (ref 0–0.5)
EOSINOPHIL NFR BLD: 1 %
ERYTHROCYTE [DISTWIDTH] IN BLOOD: 12.4 % (ref 11–15)
FASTING DURATION TIME PATIENT: NORMAL H
FOLATE SERPL-MCNC: >24 NG/ML
GLOBULIN SER-MCNC: 3.1 G/DL (ref 2–4)
GLUCOSE SERPL-MCNC: 94 MG/DL (ref 70–99)
HCT VFR BLD CALC: 39.1 % (ref 36–46.5)
HGB BLD-MCNC: 12.8 G/DL (ref 12–15.5)
IMM GRANULOCYTES # BLD AUTO: 0 K/MCL (ref 0–0.2)
IMM GRANULOCYTES # BLD: 0 %
LYMPHOCYTES # BLD: 2.2 K/MCL (ref 1–4)
LYMPHOCYTES NFR BLD: 38 %
MCH RBC QN AUTO: 33.1 PG (ref 26–34)
MCHC RBC AUTO-ENTMCNC: 32.7 G/DL (ref 32–36.5)
MCV RBC AUTO: 101 FL (ref 78–100)
MONOCYTES # BLD: 0.6 K/MCL (ref 0.3–0.9)
MONOCYTES NFR BLD: 10 %
NEUTROPHILS # BLD: 3 K/MCL (ref 1.8–7.7)
NEUTROPHILS NFR BLD: 50 %
NRBC BLD MANUAL-RTO: 0 /100 WBC
PLATELET # BLD AUTO: 248 K/MCL (ref 140–450)
POTASSIUM SERPL-SCNC: 3.7 MMOL/L (ref 3.4–5.1)
PROT SERPL-MCNC: 7 G/DL (ref 6.4–8.2)
RBC # BLD: 3.87 MIL/MCL (ref 4–5.2)
SODIUM SERPL-SCNC: 138 MMOL/L (ref 135–145)
TSH SERPL-ACNC: 3.36 MCUNITS/ML (ref 0.35–5)
VIT B12 SERPL-MCNC: 768 PG/ML (ref 211–911)
WBC # BLD: 5.9 K/MCL (ref 4.2–11)

## 2024-01-13 ENCOUNTER — TELEPHONE (OUTPATIENT)
Dept: FAMILY MEDICINE | Age: 79
End: 2024-01-13

## 2024-01-24 ENCOUNTER — TELEPHONE (OUTPATIENT)
Dept: FAMILY MEDICINE | Age: 79
End: 2024-01-24

## 2024-02-19 ENCOUNTER — TELEPHONE (OUTPATIENT)
Dept: FAMILY MEDICINE | Age: 79
End: 2024-02-19

## 2024-02-19 DIAGNOSIS — I10 ESSENTIAL HYPERTENSION: ICD-10-CM

## 2024-02-19 RX ORDER — LISINOPRIL AND HYDROCHLOROTHIAZIDE 12.5; 1 MG/1; MG/1
1 TABLET ORAL DAILY
Qty: 90 TABLET | Refills: 1 | Status: SHIPPED | OUTPATIENT
Start: 2024-02-19 | End: 2024-08-17

## 2024-02-19 RX ORDER — LISINOPRIL AND HYDROCHLOROTHIAZIDE 12.5; 1 MG/1; MG/1
1 TABLET ORAL DAILY
Qty: 90 TABLET | Refills: 1 | Status: SHIPPED | OUTPATIENT
Start: 2024-02-19 | End: 2024-02-19 | Stop reason: SDUPTHER

## 2024-02-20 ENCOUNTER — OFFICE VISIT (OUTPATIENT)
Dept: SLEEP MEDICINE | Age: 79
End: 2024-02-20

## 2024-02-20 VITALS
SYSTOLIC BLOOD PRESSURE: 117 MMHG | OXYGEN SATURATION: 99 % | TEMPERATURE: 97.3 F | HEART RATE: 71 BPM | BODY MASS INDEX: 23.74 KG/M2 | WEIGHT: 129 LBS | HEIGHT: 62 IN | DIASTOLIC BLOOD PRESSURE: 73 MMHG

## 2024-02-20 DIAGNOSIS — R41.3 MEMORY CHANGES: ICD-10-CM

## 2024-02-20 DIAGNOSIS — R40.0 DAYTIME SLEEPINESS: Primary | ICD-10-CM

## 2024-02-20 PROCEDURE — 99202 OFFICE O/P NEW SF 15 MIN: CPT | Performed by: NURSE PRACTITIONER

## 2024-02-20 PROCEDURE — 99204 OFFICE O/P NEW MOD 45 MIN: CPT | Performed by: NURSE PRACTITIONER

## 2024-02-20 ASSESSMENT — ENCOUNTER SYMPTOMS
EYES NEGATIVE: 1
ENDOCRINE NEGATIVE: 1
GASTROINTESTINAL NEGATIVE: 1
CONSTITUTIONAL NEGATIVE: 1
EXCESSIVE DAYTIME SLEEPINESS: 1
RESPIRATORY NEGATIVE: 1
PSYCHIATRIC NEGATIVE: 1

## 2024-03-07 ENCOUNTER — OFFICE VISIT (OUTPATIENT)
Dept: SLEEP MEDICINE | Age: 79
End: 2024-03-07
Attending: NURSE PRACTITIONER

## 2024-03-07 DIAGNOSIS — R41.3 MEMORY CHANGES: ICD-10-CM

## 2024-03-07 DIAGNOSIS — G47.33 OBSTRUCTIVE SLEEP APNEA SYNDROME: Primary | ICD-10-CM

## 2024-03-07 DIAGNOSIS — R40.0 DAYTIME SLEEPINESS: ICD-10-CM

## 2024-03-07 PROCEDURE — 95806 SLEEP STUDY UNATT&RESP EFFT: CPT

## 2024-03-11 ENCOUNTER — EXTERNAL RECORD (OUTPATIENT)
Dept: HEALTH INFORMATION MANAGEMENT | Facility: OTHER | Age: 79
End: 2024-03-11

## 2024-03-19 ENCOUNTER — OFFICE VISIT (OUTPATIENT)
Dept: SLEEP MEDICINE | Age: 79
End: 2024-03-19
Attending: NURSE PRACTITIONER

## 2024-03-19 VITALS
DIASTOLIC BLOOD PRESSURE: 79 MMHG | HEART RATE: 61 BPM | SYSTOLIC BLOOD PRESSURE: 117 MMHG | OXYGEN SATURATION: 99 % | HEIGHT: 62 IN | BODY MASS INDEX: 23.74 KG/M2 | WEIGHT: 129 LBS | TEMPERATURE: 97.3 F

## 2024-03-19 DIAGNOSIS — R41.3 MEMORY CHANGES: ICD-10-CM

## 2024-03-19 DIAGNOSIS — R40.0 DAYTIME SLEEPINESS: Primary | ICD-10-CM

## 2024-03-19 PROCEDURE — 99214 OFFICE O/P EST MOD 30 MIN: CPT | Performed by: NURSE PRACTITIONER

## 2024-03-19 PROCEDURE — 99211 OFF/OP EST MAY X REQ PHY/QHP: CPT | Performed by: NURSE PRACTITIONER

## 2024-03-19 ASSESSMENT — ENCOUNTER SYMPTOMS
GASTROINTESTINAL NEGATIVE: 1
CONSTITUTIONAL NEGATIVE: 1
EXCESSIVE DAYTIME SLEEPINESS: 1
RESPIRATORY NEGATIVE: 1
PSYCHIATRIC NEGATIVE: 1
EYES NEGATIVE: 1
ENDOCRINE NEGATIVE: 1

## 2024-04-22 ENCOUNTER — TELEPHONE (OUTPATIENT)
Dept: FAMILY MEDICINE | Age: 79
End: 2024-04-22

## 2024-04-22 DIAGNOSIS — J30.2 SEASONAL ALLERGIES: Primary | ICD-10-CM

## 2024-04-22 RX ORDER — FLUTICASONE PROPIONATE 50 MCG
2 SPRAY, SUSPENSION (ML) NASAL DAILY
Qty: 9.9 ML | Refills: 0 | Status: SHIPPED | OUTPATIENT
Start: 2024-04-22 | End: 2027-06-24

## 2024-05-01 ENCOUNTER — APPOINTMENT (OUTPATIENT)
Dept: SLEEP MEDICINE | Age: 79
End: 2024-05-01
Attending: NURSE PRACTITIONER

## 2024-05-07 ENCOUNTER — OFFICE VISIT (OUTPATIENT)
Dept: SLEEP MEDICINE | Age: 79
End: 2024-05-07
Attending: NURSE PRACTITIONER

## 2024-05-07 DIAGNOSIS — R41.3 MEMORY CHANGES: ICD-10-CM

## 2024-05-07 DIAGNOSIS — R40.0 DAYTIME SLEEPINESS: Primary | ICD-10-CM

## 2024-05-07 LAB — REPORT TEXT: NORMAL

## 2024-05-07 PROCEDURE — 95810 POLYSOM 6/> YRS 4/> PARAM: CPT

## 2024-05-13 ENCOUNTER — APPOINTMENT (OUTPATIENT)
Dept: SLEEP MEDICINE | Age: 79
End: 2024-05-13
Attending: NURSE PRACTITIONER

## 2024-05-13 ENCOUNTER — OFFICE VISIT (OUTPATIENT)
Dept: SLEEP MEDICINE | Age: 79
End: 2024-05-13

## 2024-05-13 VITALS
DIASTOLIC BLOOD PRESSURE: 70 MMHG | BODY MASS INDEX: 21.35 KG/M2 | WEIGHT: 116 LBS | TEMPERATURE: 98 F | HEIGHT: 62 IN | OXYGEN SATURATION: 96 % | RESPIRATION RATE: 16 BRPM | HEART RATE: 67 BPM | SYSTOLIC BLOOD PRESSURE: 122 MMHG

## 2024-05-13 DIAGNOSIS — R41.3 MEMORY CHANGES: Primary | ICD-10-CM

## 2024-05-13 DIAGNOSIS — G47.61 PLMD (PERIODIC LIMB MOVEMENT DISORDER): ICD-10-CM

## 2024-05-13 PROCEDURE — 99214 OFFICE O/P EST MOD 30 MIN: CPT | Performed by: NURSE PRACTITIONER

## 2024-05-13 PROCEDURE — 99211 OFF/OP EST MAY X REQ PHY/QHP: CPT | Performed by: NURSE PRACTITIONER

## 2024-05-13 ASSESSMENT — ENCOUNTER SYMPTOMS
CONSTITUTIONAL NEGATIVE: 1
ENDOCRINE NEGATIVE: 1
PSYCHIATRIC NEGATIVE: 1
RESPIRATORY NEGATIVE: 1
GASTROINTESTINAL NEGATIVE: 1
EYES NEGATIVE: 1

## 2024-05-20 ENCOUNTER — E-ADVICE (OUTPATIENT)
Dept: OTHER | Age: 79
End: 2024-05-20

## 2024-06-17 ENCOUNTER — EXTERNAL RECORD (OUTPATIENT)
Dept: HEALTH INFORMATION MANAGEMENT | Facility: OTHER | Age: 79
End: 2024-06-17

## 2024-06-19 ENCOUNTER — APPOINTMENT (OUTPATIENT)
Dept: FAMILY MEDICINE | Age: 79
End: 2024-06-19

## 2024-06-19 VITALS
RESPIRATION RATE: 18 BRPM | WEIGHT: 126 LBS | HEIGHT: 62 IN | TEMPERATURE: 96.8 F | SYSTOLIC BLOOD PRESSURE: 124 MMHG | HEART RATE: 60 BPM | BODY MASS INDEX: 23.19 KG/M2 | DIASTOLIC BLOOD PRESSURE: 68 MMHG | OXYGEN SATURATION: 99 %

## 2024-06-19 DIAGNOSIS — H35.3212 EXUDATIVE AGE-RELATED MACULAR DEGENERATION, RIGHT EYE, WITH INACTIVE CHOROIDAL NEOVASCULARIZATION  (CMD): ICD-10-CM

## 2024-06-19 DIAGNOSIS — I10 ESSENTIAL HYPERTENSION: Primary | ICD-10-CM

## 2024-06-19 DIAGNOSIS — H35.30 MACULAR DEGENERATION OF BOTH EYES, UNSPECIFIED TYPE: ICD-10-CM

## 2024-06-19 DIAGNOSIS — J30.2 SEASONAL ALLERGIES: ICD-10-CM

## 2024-06-19 DIAGNOSIS — R73.01 IMPAIRED FASTING GLUCOSE: ICD-10-CM

## 2024-06-19 DIAGNOSIS — R41.3 MEMORY CHANGES: ICD-10-CM

## 2024-06-19 DIAGNOSIS — E78.2 HYPERLIPIDEMIA, MIXED: ICD-10-CM

## 2024-06-19 DIAGNOSIS — Z12.31 ENCOUNTER FOR SCREENING MAMMOGRAM FOR MALIGNANT NEOPLASM OF BREAST: ICD-10-CM

## 2024-06-19 DIAGNOSIS — Z00.00 MEDICARE ANNUAL WELLNESS VISIT, SUBSEQUENT: ICD-10-CM

## 2024-06-19 RX ORDER — LISINOPRIL AND HYDROCHLOROTHIAZIDE 12.5; 1 MG/1; MG/1
1 TABLET ORAL DAILY
Qty: 90 TABLET | Refills: 1 | Status: SHIPPED | OUTPATIENT
Start: 2024-06-19 | End: 2024-12-16

## 2024-06-19 ASSESSMENT — GERIATRIC DEPRESSION SCALE SHORT VERSION (GDS-SV)
HAVE YOU DROPPED MANY OF YOUR ACTIVITIES AND INTERESTS?: NO
DO YOU OFTEN GET BORED: YES
GDS TOTAL SCORE: 6
DO YOU PREFER TO STAY AT HOME, RATHER THAN GOING OUT AND DOING NEW THINGS: YES
ARE YOU IN GOOD SPIRITS MOST OF THE TIME: YES
DO YOU FIND LIFE VERY EXCITING: NO
DO YOU THINK IT IS WONDERFUL TO BE ALIVE NOW: YES
IS IT HARD FOR YOU TO GET STARTED ON NEW PROJECTS?: YES
DO YOU FEEL THAT YOUR LIFE IS EMPTY: NO
ARE YOU BASICALLY SATISFIED WITH YOUR LIFE: YES
DO YOU OFTEN FEEL HELPLESS: NO
ARE YOU AFRAID THAT SOMETHING BAD IS GOING TO HAPPEN TO YOU: NO
DO YOU FEEL YOU HAVE MORE PROBLEMS WITH MEMORY THAN MOST: YES
DO YOU FEEL FULL OF ENERGY: NO
DO YOU FEEL HAPPY MOST OF THE TIME: YES
DO YOU FEEL PRETTY WORTHLESS THE WAY YOU ARE NOW: NO

## 2024-06-19 ASSESSMENT — ENCOUNTER SYMPTOMS
EYE REDNESS: 0
COUGH: 0
SHORTNESS OF BREATH: 0
SINUS PAIN: 0
APPETITE CHANGE: 0
DIZZINESS: 0
EYE DISCHARGE: 0
LIGHT-HEADEDNESS: 0
SINUS PRESSURE: 0
FEVER: 0
WHEEZING: 0
CONSTIPATION: 0
ABDOMINAL PAIN: 0
SORE THROAT: 0
DIARRHEA: 0
WEAKNESS: 0
SLEEP DISTURBANCE: 0
TROUBLE SWALLOWING: 0
RHINORRHEA: 0
EYE PAIN: 0
HEADACHES: 0
NAUSEA: 0
ADENOPATHY: 0
EYE ITCHING: 0
VOMITING: 0
NUMBNESS: 0
CONFUSION: 1
WOUND: 0
CHILLS: 0
FATIGUE: 0

## 2024-06-19 ASSESSMENT — PATIENT HEALTH QUESTIONNAIRE - PHQ9
2. FEELING DOWN, DEPRESSED OR HOPELESS: NOT AT ALL
CLINICAL INTERPRETATION OF PHQ2 SCORE: NO FURTHER SCREENING NEEDED
SUM OF ALL RESPONSES TO PHQ9 QUESTIONS 1 AND 2: 0
1. LITTLE INTEREST OR PLEASURE IN DOING THINGS: NOT AT ALL
SUM OF ALL RESPONSES TO PHQ9 QUESTIONS 1 AND 2: 0

## 2024-06-19 ASSESSMENT — ACTIVITIES OF DAILY LIVING (ADL)
NEEDS_ASSIST: NO
ADL_SCORE: 12
ADL_BEFORE_ADMISSION: INDEPENDENT
SENSORY_SUPPORT_DEVICES: EYEGLASSES
RECENT_DECLINE_ADL: NO
ADL_SHORT_OF_BREATH: NO

## 2024-06-19 ASSESSMENT — MINI COG
PATIENT ABLE TO REPEAT THE 3 WORDS GIVEN PREVIOUSLY?: WAS ABLE TO REPEAT BACK 1 WORD CORRECTLY
PATIENT WAS GIVEN REPEAT BACK WORDS FROM VERSION: 3 - VILLAGE KITCHEN BABY
TOTAL SCORE: 1
PATIENT ABLE TO FILL IN THE CLOCK FACE WITH 10 MINUTES PAST 11 O'CLOCK?: NO, CLOCK IS NOT CORRECT

## 2024-06-19 ASSESSMENT — COGNITIVE AND FUNCTIONAL STATUS - GENERAL
DO YOU HAVE SERIOUS DIFFICULTY WALKING OR CLIMBING STAIRS: NO
DO YOU HAVE DIFFICULTY DRESSING OR BATHING: NO
BECAUSE OF A PHYSICAL, MENTAL, OR EMOTIONAL CONDITION, DO YOU HAVE DIFFICULTY DOING ERRANDS ALONE: YES
BECAUSE OF A PHYSICAL, MENTAL, OR EMOTIONAL CONDITION, DO YOU HAVE SERIOUS DIFFICULTY CONCENTRATING, REMEMBERING OR MAKING DECISIONS: NO

## 2024-06-20 LAB
ALBUMIN SERPL-MCNC: 3.7 G/DL (ref 3.6–5.1)
ALBUMIN/GLOB SERPL: 1.2 {RATIO} (ref 1–2.4)
ALP SERPL-CCNC: 71 UNITS/L (ref 45–117)
ALT SERPL-CCNC: 19 UNITS/L
ANION GAP SERPL CALC-SCNC: 10 MMOL/L (ref 7–19)
AST SERPL-CCNC: 16 UNITS/L
BASOPHILS # BLD: 0 K/MCL (ref 0–0.3)
BASOPHILS NFR BLD: 1 %
BILIRUB SERPL-MCNC: 0.5 MG/DL (ref 0.2–1)
BUN SERPL-MCNC: 18 MG/DL (ref 6–20)
BUN/CREAT SERPL: 24 (ref 7–25)
CALCIUM SERPL-MCNC: 9.1 MG/DL (ref 8.4–10.2)
CHLORIDE SERPL-SCNC: 108 MMOL/L (ref 97–110)
CO2 SERPL-SCNC: 28 MMOL/L (ref 21–32)
CREAT SERPL-MCNC: 0.75 MG/DL (ref 0.51–0.95)
DEPRECATED RDW RBC: 48.5 FL (ref 39–50)
EGFRCR SERPLBLD CKD-EPI 2021: 81 ML/MIN/{1.73_M2}
EOSINOPHIL # BLD: 0 K/MCL (ref 0–0.5)
EOSINOPHIL NFR BLD: 1 %
ERYTHROCYTE [DISTWIDTH] IN BLOOD: 12.7 % (ref 11–15)
FASTING DURATION TIME PATIENT: NORMAL H
FOLATE SERPL-MCNC: 15.5 NG/ML
GLOBULIN SER-MCNC: 3.1 G/DL (ref 2–4)
GLUCOSE SERPL-MCNC: 71 MG/DL (ref 70–99)
HBA1C MFR BLD: 5.5 % (ref 4.5–5.6)
HCT VFR BLD CALC: 40.2 % (ref 36–46.5)
HGB BLD-MCNC: 13.4 G/DL (ref 12–15.5)
IMM GRANULOCYTES # BLD AUTO: 0 K/MCL (ref 0–0.2)
IMM GRANULOCYTES # BLD: 0 %
LYMPHOCYTES # BLD: 2.1 K/MCL (ref 1–4)
LYMPHOCYTES NFR BLD: 33 %
MCH RBC QN AUTO: 34.6 PG (ref 26–34)
MCHC RBC AUTO-ENTMCNC: 33.3 G/DL (ref 32–36.5)
MCV RBC AUTO: 103.9 FL (ref 78–100)
MONOCYTES # BLD: 0.6 K/MCL (ref 0.3–0.9)
MONOCYTES NFR BLD: 9 %
NEUTROPHILS # BLD: 3.6 K/MCL (ref 1.8–7.7)
NEUTROPHILS NFR BLD: 56 %
NRBC BLD MANUAL-RTO: 0 /100 WBC
PLATELET # BLD AUTO: 266 K/MCL (ref 140–450)
POTASSIUM SERPL-SCNC: 3.9 MMOL/L (ref 3.4–5.1)
PROT SERPL-MCNC: 6.8 G/DL (ref 6.4–8.2)
RBC # BLD: 3.87 MIL/MCL (ref 4–5.2)
SODIUM SERPL-SCNC: 142 MMOL/L (ref 135–145)
TSH SERPL-ACNC: 2.23 MCUNITS/ML (ref 0.35–5)
VIT B12 SERPL-MCNC: 411 PG/ML (ref 211–911)
WBC # BLD: 6.4 K/MCL (ref 4.2–11)

## 2024-07-08 ENCOUNTER — EXTERNAL RECORD (OUTPATIENT)
Dept: HEALTH INFORMATION MANAGEMENT | Facility: OTHER | Age: 79
End: 2024-07-08

## 2024-08-02 ENCOUNTER — TELEPHONE (OUTPATIENT)
Dept: FAMILY MEDICINE | Age: 79
End: 2024-08-02

## 2024-08-14 ENCOUNTER — TELEPHONE (OUTPATIENT)
Dept: FAMILY MEDICINE | Age: 79
End: 2024-08-14

## 2024-08-14 DIAGNOSIS — I10 ESSENTIAL HYPERTENSION: ICD-10-CM

## 2024-08-14 RX ORDER — LISINOPRIL/HYDROCHLOROTHIAZIDE 10-12.5 MG
1 TABLET ORAL DAILY
Qty: 90 TABLET | Refills: 1 | Status: SHIPPED | OUTPATIENT
Start: 2024-08-14 | End: 2025-02-10

## 2024-10-02 ENCOUNTER — TELEPHONE (OUTPATIENT)
Dept: FAMILY MEDICINE | Age: 79
End: 2024-10-02

## 2024-10-22 ENCOUNTER — TELEPHONE (OUTPATIENT)
Dept: FAMILY MEDICINE | Age: 79
End: 2024-10-22

## 2024-11-05 ENCOUNTER — APPOINTMENT (OUTPATIENT)
Dept: FAMILY MEDICINE | Age: 79
End: 2024-11-05

## 2024-11-05 VITALS
WEIGHT: 125 LBS | BODY MASS INDEX: 23 KG/M2 | DIASTOLIC BLOOD PRESSURE: 70 MMHG | SYSTOLIC BLOOD PRESSURE: 122 MMHG | HEIGHT: 62 IN | RESPIRATION RATE: 20 BRPM | HEART RATE: 70 BPM | OXYGEN SATURATION: 97 % | TEMPERATURE: 97.2 F

## 2024-11-05 DIAGNOSIS — R41.3 MEMORY CHANGES: ICD-10-CM

## 2024-11-05 DIAGNOSIS — Z23 NEED FOR VACCINATION: Primary | ICD-10-CM

## 2024-11-05 DIAGNOSIS — E28.39 OTHER PRIMARY OVARIAN FAILURE: ICD-10-CM

## 2024-11-05 RX ORDER — DONEPEZIL HYDROCHLORIDE 5 MG/1
5 TABLET, FILM COATED ORAL NIGHTLY
Qty: 90 TABLET | Refills: 1 | Status: SHIPPED | OUTPATIENT
Start: 2024-11-05

## 2024-11-05 ASSESSMENT — ENCOUNTER SYMPTOMS
SORE THROAT: 0
APPETITE CHANGE: 0
RHINORRHEA: 0
TROUBLE SWALLOWING: 0
VOMITING: 0
COUGH: 0
NUMBNESS: 0
DIARRHEA: 0
LIGHT-HEADEDNESS: 0
DIAPHORESIS: 0
FATIGUE: 0
CONSTIPATION: 0
SINUS PRESSURE: 0
EYE ITCHING: 0
SHORTNESS OF BREATH: 0
WEAKNESS: 0
FEVER: 0
HEADACHES: 0
DIZZINESS: 0
EYE PAIN: 0
EYE REDNESS: 0
SINUS PAIN: 0
WOUND: 0
NAUSEA: 0
ADENOPATHY: 0
ABDOMINAL PAIN: 0
SLEEP DISTURBANCE: 0
WHEEZING: 0
EYE DISCHARGE: 0
CHILLS: 0
CONFUSION: 1

## 2024-11-05 ASSESSMENT — PATIENT HEALTH QUESTIONNAIRE - PHQ9
2. FEELING DOWN, DEPRESSED OR HOPELESS: NOT AT ALL
SUM OF ALL RESPONSES TO PHQ9 QUESTIONS 1 AND 2: 0
CLINICAL INTERPRETATION OF PHQ2 SCORE: NO FURTHER SCREENING NEEDED
SUM OF ALL RESPONSES TO PHQ9 QUESTIONS 1 AND 2: 0
1. LITTLE INTEREST OR PLEASURE IN DOING THINGS: NOT AT ALL

## 2024-11-15 ENCOUNTER — TELEPHONE (OUTPATIENT)
Dept: FAMILY MEDICINE | Age: 79
End: 2024-11-15

## 2024-11-18 ENCOUNTER — HOSPITAL ENCOUNTER (OUTPATIENT)
Dept: MRI IMAGING | Age: 79
Discharge: HOME | End: 2024-11-18
Attending: FAMILY MEDICINE

## 2024-11-18 DIAGNOSIS — R41.3 MEMORY CHANGES: ICD-10-CM

## 2024-11-18 PROCEDURE — 70551 MRI BRAIN STEM W/O DYE: CPT

## 2024-11-26 ENCOUNTER — TELEPHONE (OUTPATIENT)
Dept: FAMILY MEDICINE | Age: 79
End: 2024-11-26

## 2024-12-19 ENCOUNTER — APPOINTMENT (OUTPATIENT)
Dept: FAMILY MEDICINE | Age: 79
End: 2024-12-19

## 2025-01-27 ENCOUNTER — CLINICAL ABSTRACT (OUTPATIENT)
Dept: HEALTH INFORMATION MANAGEMENT | Facility: OTHER | Age: 80
End: 2025-01-27

## 2025-01-27 ENCOUNTER — APPOINTMENT (OUTPATIENT)
Dept: FAMILY MEDICINE | Age: 80
End: 2025-01-27

## 2025-02-03 DIAGNOSIS — R41.3 MEMORY CHANGES: ICD-10-CM

## 2025-02-03 RX ORDER — DONEPEZIL HYDROCHLORIDE 5 MG/1
5 TABLET, FILM COATED ORAL NIGHTLY
Qty: 90 TABLET | Refills: 1 | Status: CANCELLED | OUTPATIENT
Start: 2025-02-03

## 2025-02-17 ENCOUNTER — APPOINTMENT (OUTPATIENT)
Dept: FAMILY MEDICINE | Age: 80
End: 2025-02-17

## 2025-02-17 VITALS
RESPIRATION RATE: 18 BRPM | WEIGHT: 130 LBS | BODY MASS INDEX: 24.55 KG/M2 | HEART RATE: 60 BPM | DIASTOLIC BLOOD PRESSURE: 68 MMHG | OXYGEN SATURATION: 97 % | SYSTOLIC BLOOD PRESSURE: 130 MMHG | HEIGHT: 61 IN | TEMPERATURE: 96.8 F

## 2025-02-17 DIAGNOSIS — R41.3 MEMORY CHANGES: ICD-10-CM

## 2025-02-17 DIAGNOSIS — E78.2 HYPERLIPIDEMIA, MIXED: ICD-10-CM

## 2025-02-17 DIAGNOSIS — I10 ESSENTIAL HYPERTENSION: Primary | ICD-10-CM

## 2025-02-17 DIAGNOSIS — Z12.31 ENCOUNTER FOR SCREENING MAMMOGRAM FOR MALIGNANT NEOPLASM OF BREAST: ICD-10-CM

## 2025-02-17 DIAGNOSIS — H35.30 MACULAR DEGENERATION OF BOTH EYES, UNSPECIFIED TYPE: ICD-10-CM

## 2025-02-17 DIAGNOSIS — R73.01 IMPAIRED FASTING GLUCOSE: ICD-10-CM

## 2025-02-17 DIAGNOSIS — J30.2 SEASONAL ALLERGIES: ICD-10-CM

## 2025-02-17 DIAGNOSIS — H35.3212 EXUDATIVE AGE-RELATED MACULAR DEGENERATION, RIGHT EYE, WITH INACTIVE CHOROIDAL NEOVASCULARIZATION (CMD): ICD-10-CM

## 2025-02-17 PROCEDURE — 99214 OFFICE O/P EST MOD 30 MIN: CPT | Performed by: FAMILY MEDICINE

## 2025-02-17 RX ORDER — LISINOPRIL AND HYDROCHLOROTHIAZIDE 10; 12.5 MG/1; MG/1
1 TABLET ORAL DAILY
Qty: 90 TABLET | Refills: 1 | Status: SHIPPED | OUTPATIENT
Start: 2025-02-17 | End: 2025-08-16

## 2025-02-17 RX ORDER — DONEPEZIL HYDROCHLORIDE 5 MG/1
10 TABLET, FILM COATED ORAL NIGHTLY
Qty: 180 TABLET | Refills: 1 | Status: SHIPPED | OUTPATIENT
Start: 2025-02-17

## 2025-02-17 ASSESSMENT — ENCOUNTER SYMPTOMS
LIGHT-HEADEDNESS: 0
DIZZINESS: 0
SINUS PAIN: 0
EYE PAIN: 0
WHEEZING: 0
APPETITE CHANGE: 0
WOUND: 0
EYE ITCHING: 0
HEADACHES: 0
FATIGUE: 0
SLEEP DISTURBANCE: 0
NAUSEA: 0
DIARRHEA: 0
CHILLS: 0
RHINORRHEA: 0
NUMBNESS: 0
ADENOPATHY: 0
FEVER: 0
EYE DISCHARGE: 0
ABDOMINAL PAIN: 0
SORE THROAT: 0
CONSTIPATION: 0
VOMITING: 0
SHORTNESS OF BREATH: 0
EYE REDNESS: 0
CONFUSION: 1
SINUS PRESSURE: 0
TROUBLE SWALLOWING: 0
WEAKNESS: 0
COUGH: 1

## 2025-02-17 ASSESSMENT — PATIENT HEALTH QUESTIONNAIRE - PHQ9
1. LITTLE INTEREST OR PLEASURE IN DOING THINGS: NOT AT ALL
SUM OF ALL RESPONSES TO PHQ9 QUESTIONS 1 AND 2: 1
SUM OF ALL RESPONSES TO PHQ9 QUESTIONS 1 AND 2: 1
2. FEELING DOWN, DEPRESSED OR HOPELESS: SEVERAL DAYS
CLINICAL INTERPRETATION OF PHQ2 SCORE: NO FURTHER SCREENING NEEDED

## 2025-03-31 ENCOUNTER — TELEPHONE (OUTPATIENT)
Dept: FAMILY MEDICINE | Age: 80
End: 2025-03-31

## 2025-03-31 DIAGNOSIS — H93.13 TINNITUS OF BOTH EARS: Primary | ICD-10-CM

## 2025-03-31 DIAGNOSIS — H91.90 HEARING LOSS, UNSPECIFIED HEARING LOSS TYPE, UNSPECIFIED LATERALITY: ICD-10-CM

## 2025-04-03 ENCOUNTER — TELEPHONE (OUTPATIENT)
Dept: FAMILY MEDICINE | Age: 80
End: 2025-04-03

## 2025-06-10 ENCOUNTER — APPOINTMENT (OUTPATIENT)
Dept: FAMILY MEDICINE | Age: 80
End: 2025-06-10

## 2025-06-10 VITALS
BODY MASS INDEX: 26.11 KG/M2 | TEMPERATURE: 97.7 F | SYSTOLIC BLOOD PRESSURE: 118 MMHG | RESPIRATION RATE: 16 BRPM | DIASTOLIC BLOOD PRESSURE: 78 MMHG | WEIGHT: 133 LBS | HEART RATE: 60 BPM | HEIGHT: 60 IN | OXYGEN SATURATION: 97 %

## 2025-06-10 DIAGNOSIS — I10 ESSENTIAL HYPERTENSION: Primary | ICD-10-CM

## 2025-06-10 DIAGNOSIS — H35.30 MACULAR DEGENERATION OF BOTH EYES, UNSPECIFIED TYPE: ICD-10-CM

## 2025-06-10 DIAGNOSIS — J30.2 SEASONAL ALLERGIES: ICD-10-CM

## 2025-06-10 DIAGNOSIS — Z00.00 MEDICARE ANNUAL WELLNESS VISIT, SUBSEQUENT: ICD-10-CM

## 2025-06-10 DIAGNOSIS — G30.1 MILD LATE ONSET ALZHEIMER'S DEMENTIA WITHOUT BEHAVIORAL DISTURBANCE, PSYCHOTIC DISTURBANCE, MOOD DISTURBANCE, OR ANXIETY  (CMD): ICD-10-CM

## 2025-06-10 DIAGNOSIS — R73.01 IMPAIRED FASTING GLUCOSE: ICD-10-CM

## 2025-06-10 DIAGNOSIS — Z12.31 ENCOUNTER FOR SCREENING MAMMOGRAM FOR MALIGNANT NEOPLASM OF BREAST: ICD-10-CM

## 2025-06-10 DIAGNOSIS — E78.2 HYPERLIPIDEMIA, MIXED: ICD-10-CM

## 2025-06-10 DIAGNOSIS — R22.1 NECK MASS: ICD-10-CM

## 2025-06-10 DIAGNOSIS — F02.A0 MILD LATE ONSET ALZHEIMER'S DEMENTIA WITHOUT BEHAVIORAL DISTURBANCE, PSYCHOTIC DISTURBANCE, MOOD DISTURBANCE, OR ANXIETY  (CMD): ICD-10-CM

## 2025-06-10 RX ORDER — LORATADINE 10 MG/1
10 TABLET ORAL DAILY
Qty: 90 TABLET | Refills: 0 | Status: SHIPPED | OUTPATIENT
Start: 2025-06-10

## 2025-06-10 RX ORDER — LISINOPRIL AND HYDROCHLOROTHIAZIDE 10; 12.5 MG/1; MG/1
1 TABLET ORAL DAILY
Qty: 90 TABLET | Refills: 1 | Status: SHIPPED | OUTPATIENT
Start: 2025-06-10 | End: 2025-12-07

## 2025-06-10 RX ORDER — FLUTICASONE PROPIONATE 50 MCG
2 SPRAY, SUSPENSION (ML) NASAL DAILY
Qty: 9.9 ML | Refills: 0 | Status: SHIPPED | OUTPATIENT
Start: 2025-06-10 | End: 2028-08-11

## 2025-06-10 RX ORDER — DONEPEZIL HYDROCHLORIDE 5 MG/1
10 TABLET, FILM COATED ORAL NIGHTLY
Qty: 180 TABLET | Refills: 1 | Status: SHIPPED | OUTPATIENT
Start: 2025-06-10

## 2025-06-10 ASSESSMENT — ENCOUNTER SYMPTOMS
CONSTIPATION: 0
FATIGUE: 0
COUGH: 0
NAUSEA: 0
VOMITING: 0
WEAKNESS: 0
SINUS PRESSURE: 0
EYE REDNESS: 0
CONFUSION: 1
NUMBNESS: 0
SORE THROAT: 0
WOUND: 0
DIARRHEA: 0
SINUS PAIN: 0
CHILLS: 0
LIGHT-HEADEDNESS: 0
FEVER: 0
ABDOMINAL PAIN: 0
APPETITE CHANGE: 0
RHINORRHEA: 1
EYE DISCHARGE: 0
EYE PAIN: 0
WHEEZING: 0
SHORTNESS OF BREATH: 0
DIZZINESS: 0
HEADACHES: 0
ADENOPATHY: 0
EYE ITCHING: 0
SLEEP DISTURBANCE: 0
TROUBLE SWALLOWING: 0

## 2025-06-10 ASSESSMENT — PATIENT HEALTH QUESTIONNAIRE - PHQ9
SUM OF ALL RESPONSES TO PHQ9 QUESTIONS 1 AND 2: 0
2. FEELING DOWN, DEPRESSED OR HOPELESS: NOT AT ALL
SUM OF ALL RESPONSES TO PHQ9 QUESTIONS 1 AND 2: 0
CLINICAL INTERPRETATION OF PHQ2 SCORE: NO FURTHER SCREENING NEEDED
1. LITTLE INTEREST OR PLEASURE IN DOING THINGS: NOT AT ALL

## 2025-06-11 LAB
ALBUMIN SERPL-MCNC: 3.8 G/DL (ref 3.4–5)
ALBUMIN/GLOB SERPL: 1.2 {RATIO} (ref 1–2.4)
ALP SERPL-CCNC: 73 UNITS/L (ref 45–117)
ALT SERPL-CCNC: 24 UNITS/L
ANION GAP SERPL CALC-SCNC: 9 MMOL/L (ref 7–19)
AST SERPL-CCNC: 19 UNITS/L
BASOPHILS # BLD: 0 K/MCL (ref 0–0.3)
BASOPHILS NFR BLD: 1 %
BILIRUB SERPL-MCNC: 0.4 MG/DL (ref 0.2–1)
BUN SERPL-MCNC: 25 MG/DL (ref 6–20)
BUN/CREAT SERPL: 34 (ref 7–25)
CALCIUM SERPL-MCNC: 9.5 MG/DL (ref 8.4–10.2)
CHLORIDE SERPL-SCNC: 108 MMOL/L (ref 97–110)
CO2 SERPL-SCNC: 28 MMOL/L (ref 21–32)
CREAT SERPL-MCNC: 0.74 MG/DL (ref 0.51–0.95)
DEPRECATED RDW RBC: 47 FL (ref 39–50)
EGFRCR SERPLBLD CKD-EPI 2021: 82 ML/MIN/{1.73_M2}
EOSINOPHIL # BLD: 0.1 K/MCL (ref 0–0.5)
EOSINOPHIL NFR BLD: 1 %
ERYTHROCYTE [DISTWIDTH] IN BLOOD: 12.6 % (ref 11–15)
FASTING DURATION TIME PATIENT: ABNORMAL H
GLOBULIN SER-MCNC: 3.1 G/DL (ref 2–4)
GLUCOSE SERPL-MCNC: 100 MG/DL (ref 70–99)
HBA1C MFR BLD: 5.7 % (ref 4.5–5.6)
HCT VFR BLD CALC: 36.1 % (ref 36–46.5)
HGB BLD-MCNC: 12.2 G/DL (ref 12–15.5)
IMM GRANULOCYTES # BLD AUTO: 0 K/MCL (ref 0–0.2)
IMM GRANULOCYTES # BLD: 0 %
LYMPHOCYTES # BLD: 2.4 K/MCL (ref 1–4)
LYMPHOCYTES NFR BLD: 42 %
MCH RBC QN AUTO: 34.6 PG (ref 26–34)
MCHC RBC AUTO-ENTMCNC: 33.8 G/DL (ref 32–36.5)
MCV RBC AUTO: 102.3 FL (ref 78–100)
MONOCYTES # BLD: 0.6 K/MCL (ref 0.3–0.9)
MONOCYTES NFR BLD: 10 %
NEUTROPHILS # BLD: 2.6 K/MCL (ref 1.8–7.7)
NEUTROPHILS NFR BLD: 46 %
NRBC BLD MANUAL-RTO: 0 /100 WBC
PLATELET # BLD AUTO: 255 K/MCL (ref 140–450)
POTASSIUM SERPL-SCNC: 4.1 MMOL/L (ref 3.4–5.1)
PROT SERPL-MCNC: 6.9 G/DL (ref 6.4–8.2)
RBC # BLD: 3.53 MIL/MCL (ref 4–5.2)
SODIUM SERPL-SCNC: 141 MMOL/L (ref 135–145)
TSH SERPL-ACNC: 2.44 MCUNITS/ML (ref 0.35–5)
WBC # BLD: 5.6 K/MCL (ref 4.2–11)

## 2025-06-12 ENCOUNTER — RESULTS FOLLOW-UP (OUTPATIENT)
Dept: FAMILY MEDICINE | Age: 80
End: 2025-06-12

## 2025-06-12 DIAGNOSIS — K11.8 PAROTID MASS: Primary | ICD-10-CM

## 2025-06-13 ENCOUNTER — HOSPITAL ENCOUNTER (OUTPATIENT)
Dept: CT IMAGING | Age: 80
Discharge: HOME | End: 2025-06-13
Attending: FAMILY MEDICINE

## 2025-06-13 DIAGNOSIS — R22.1 NECK MASS: ICD-10-CM

## 2025-06-13 PROCEDURE — 70490 CT SOFT TISSUE NECK W/O DYE: CPT

## 2025-06-16 ENCOUNTER — TELEPHONE (OUTPATIENT)
Dept: FAMILY MEDICINE | Age: 80
End: 2025-06-16

## 2025-06-17 PROCEDURE — 88173 CYTOPATH EVAL FNA REPORT: CPT | Performed by: CLINICAL MEDICAL LABORATORY

## 2025-06-18 ENCOUNTER — LAB REQUISITION (OUTPATIENT)
Dept: LAB | Age: 80
End: 2025-06-18

## 2025-06-18 DIAGNOSIS — D37.030 NEOPLASM OF UNCERTAIN BEHAVIOR OF THE PAROTID SALIVARY GLANDS: ICD-10-CM

## 2025-06-18 LAB
ASR DISCLAIMER: NORMAL
CASE RPRT: NORMAL
CLINICAL INFO: NORMAL
PATH REPORT.FINAL DX SPEC: NORMAL
PATH REPORT.GROSS SPEC: NORMAL

## 2025-06-30 ENCOUNTER — TELEPHONE (OUTPATIENT)
Dept: PREADMISSION TESTING | Age: 80
End: 2025-06-30

## 2025-06-30 VITALS — WEIGHT: 133 LBS | BODY MASS INDEX: 26.11 KG/M2 | HEIGHT: 60 IN

## 2025-06-30 SDOH — SOCIAL STABILITY: SOCIAL INSECURITY: HOW OFTEN DOES ANYONE, INCLUDING FAMILY AND FRIENDS, THREATEN YOU WITH HARM?: NEVER

## 2025-06-30 SDOH — SOCIAL STABILITY: SOCIAL INSECURITY: HOW OFTEN DOES ANYONE, INCLUDING FAMILY AND FRIENDS, SCREAM OR CURSE AT YOU?: NEVER

## 2025-06-30 SDOH — SOCIAL STABILITY: SOCIAL INSECURITY: HOW OFTEN DOES ANYONE, INCLUDING FAMILY AND FRIENDS, PHYSICALLY HURT YOU?: NEVER

## 2025-06-30 SDOH — SOCIAL STABILITY: SOCIAL INSECURITY: HOW OFTEN DOES ANYONE, INCLUDING FAMILY AND FRIENDS, INSULT OR TALK DOWN TO YOU?: NEVER

## 2025-06-30 ASSESSMENT — ACTIVITIES OF DAILY LIVING (ADL)
ADL_SCORE: 12
HISTORY OF FALLING IN THE LAST YEAR (PRIOR TO ADMISSION): NO
ADL_SHORT_OF_BREATH: NO
SENSORY_SUPPORT_DEVICES: EYEGLASSES
NEEDS_ASSIST: NO
ADL_BEFORE_ADMISSION: INDEPENDENT

## 2025-07-02 ENCOUNTER — HOSPITAL ENCOUNTER (OUTPATIENT)
Dept: MAMMOGRAPHY | Age: 80
Discharge: HOME | End: 2025-07-02
Attending: FAMILY MEDICINE

## 2025-07-02 DIAGNOSIS — Z12.31 ENCOUNTER FOR SCREENING MAMMOGRAM FOR MALIGNANT NEOPLASM OF BREAST: ICD-10-CM

## 2025-07-02 PROCEDURE — 77063 BREAST TOMOSYNTHESIS BI: CPT

## 2025-07-08 ENCOUNTER — HOSPITAL ENCOUNTER (OUTPATIENT)
Dept: CARDIOLOGY | Age: 80
Discharge: HOME | End: 2025-07-08
Attending: STUDENT IN AN ORGANIZED HEALTH CARE EDUCATION/TRAINING PROGRAM

## 2025-07-08 VITALS
HEIGHT: 60 IN | DIASTOLIC BLOOD PRESSURE: 83 MMHG | WEIGHT: 130.51 LBS | TEMPERATURE: 97.3 F | RESPIRATION RATE: 16 BRPM | BODY MASS INDEX: 25.62 KG/M2 | HEART RATE: 54 BPM | OXYGEN SATURATION: 97 % | SYSTOLIC BLOOD PRESSURE: 150 MMHG

## 2025-07-08 DIAGNOSIS — D37.030 NEOPLASM OF UNCERTAIN BEHAVIOR OF PAROTID SALIVARY GLAND: ICD-10-CM

## 2025-07-08 LAB
INR, POC: 1
TEST LOT EXPIRATION DATE: NORMAL
TEST LOT NUMBER: NORMAL

## 2025-07-08 PROCEDURE — 10002801 HB RX 250 W/O HCPCS: Performed by: RADIOLOGY

## 2025-07-08 PROCEDURE — 76942 ECHO GUIDE FOR BIOPSY: CPT

## 2025-07-08 PROCEDURE — A4649 SURGICAL SUPPLIES: HCPCS

## 2025-07-08 PROCEDURE — 88305 TISSUE EXAM BY PATHOLOGIST: CPT | Performed by: STUDENT IN AN ORGANIZED HEALTH CARE EDUCATION/TRAINING PROGRAM

## 2025-07-08 PROCEDURE — 13000001 HB PHASE II RECOVERY EA 30 MINUTES

## 2025-07-08 PROCEDURE — 10006023 HB SUPPLY 272

## 2025-07-08 PROCEDURE — 10002800 HB RX 250 W HCPCS: Performed by: RADIOLOGY

## 2025-07-08 PROCEDURE — 88173 CYTOPATH EVAL FNA REPORT: CPT | Performed by: STUDENT IN AN ORGANIZED HEALTH CARE EDUCATION/TRAINING PROGRAM

## 2025-07-08 RX ADMIN — LIDOCAINE HYDROCHLORIDE 10 ML: 10 INJECTION, SOLUTION EPIDURAL; INFILTRATION; INTRACAUDAL; PERINEURAL at 14:51

## 2025-07-08 ASSESSMENT — PAIN SCALES - GENERAL
PAINLEVEL_OUTOF10: 0
PAINLEVEL_OUTOF10: 0

## 2025-07-10 DIAGNOSIS — G30.1 MILD LATE ONSET ALZHEIMER'S DEMENTIA WITHOUT BEHAVIORAL DISTURBANCE, PSYCHOTIC DISTURBANCE, MOOD DISTURBANCE, OR ANXIETY  (CMD): ICD-10-CM

## 2025-07-10 DIAGNOSIS — I10 ESSENTIAL HYPERTENSION: ICD-10-CM

## 2025-07-10 DIAGNOSIS — F02.A0 MILD LATE ONSET ALZHEIMER'S DEMENTIA WITHOUT BEHAVIORAL DISTURBANCE, PSYCHOTIC DISTURBANCE, MOOD DISTURBANCE, OR ANXIETY  (CMD): ICD-10-CM

## 2025-07-10 LAB
ASR DISCLAIMER: NORMAL
ASR DISCLAIMER: NORMAL
CASE RPRT: NORMAL
CASE RPRT: NORMAL
CLINICAL INFO: NORMAL
CLINICAL INFO: NORMAL
PATH REPORT.FINAL DX SPEC: NORMAL
PATH REPORT.GROSS SPEC: NORMAL
PATH REPORT.GROSS SPEC: NORMAL

## 2025-07-10 RX ORDER — DONEPEZIL HYDROCHLORIDE 5 MG/1
10 TABLET, FILM COATED ORAL NIGHTLY
Qty: 180 TABLET | Refills: 1 | Status: SHIPPED | OUTPATIENT
Start: 2025-07-10 | End: 2025-07-11 | Stop reason: SDUPTHER

## 2025-07-10 RX ORDER — LISINOPRIL AND HYDROCHLOROTHIAZIDE 10; 12.5 MG/1; MG/1
1 TABLET ORAL DAILY
Qty: 90 TABLET | Refills: 1 | Status: SHIPPED | OUTPATIENT
Start: 2025-07-10 | End: 2025-07-11 | Stop reason: SDUPTHER

## 2025-07-11 DIAGNOSIS — I10 ESSENTIAL HYPERTENSION: ICD-10-CM

## 2025-07-11 DIAGNOSIS — F02.A0 MILD LATE ONSET ALZHEIMER'S DEMENTIA WITHOUT BEHAVIORAL DISTURBANCE, PSYCHOTIC DISTURBANCE, MOOD DISTURBANCE, OR ANXIETY  (CMD): ICD-10-CM

## 2025-07-11 DIAGNOSIS — G30.1 MILD LATE ONSET ALZHEIMER'S DEMENTIA WITHOUT BEHAVIORAL DISTURBANCE, PSYCHOTIC DISTURBANCE, MOOD DISTURBANCE, OR ANXIETY  (CMD): ICD-10-CM

## 2025-07-11 RX ORDER — LISINOPRIL AND HYDROCHLOROTHIAZIDE 10; 12.5 MG/1; MG/1
1 TABLET ORAL DAILY
Qty: 90 TABLET | Refills: 1 | OUTPATIENT
Start: 2025-07-11 | End: 2026-01-07

## 2025-07-11 RX ORDER — DONEPEZIL HYDROCHLORIDE 5 MG/1
10 TABLET, FILM COATED ORAL NIGHTLY
Qty: 180 TABLET | Refills: 1 | OUTPATIENT
Start: 2025-07-11